# Patient Record
Sex: FEMALE | Race: WHITE | Employment: STUDENT | URBAN - METROPOLITAN AREA
[De-identification: names, ages, dates, MRNs, and addresses within clinical notes are randomized per-mention and may not be internally consistent; named-entity substitution may affect disease eponyms.]

---
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
## 2022-08-22 ENCOUNTER — OFFICE VISIT (OUTPATIENT)
Dept: FAMILY MEDICINE CLINIC | Facility: CLINIC | Age: 22
End: 2022-08-22

## 2022-08-22 VITALS
HEART RATE: 84 BPM | WEIGHT: 217 LBS | BODY MASS INDEX: 37.05 KG/M2 | OXYGEN SATURATION: 98 % | HEIGHT: 64 IN | DIASTOLIC BLOOD PRESSURE: 80 MMHG | SYSTOLIC BLOOD PRESSURE: 122 MMHG

## 2022-08-22 DIAGNOSIS — Z20.2 POSSIBLE EXPOSURE TO STD: ICD-10-CM

## 2022-08-22 DIAGNOSIS — R63.5 WEIGHT GAIN: ICD-10-CM

## 2022-08-22 DIAGNOSIS — K12.0 CANKER SORES ORAL: ICD-10-CM

## 2022-08-22 DIAGNOSIS — H65.193 ACUTE EFFUSION OF BOTH MIDDLE EARS: ICD-10-CM

## 2022-08-22 DIAGNOSIS — R53.82 CHRONIC FATIGUE: Primary | ICD-10-CM

## 2022-08-22 DIAGNOSIS — Z00.00 PREVENTATIVE HEALTH CARE: ICD-10-CM

## 2022-08-22 PROCEDURE — 84481 FREE ASSAY (FT-3): CPT | Performed by: NURSE PRACTITIONER

## 2022-08-22 PROCEDURE — 84403 ASSAY OF TOTAL TESTOSTERONE: CPT | Performed by: NURSE PRACTITIONER

## 2022-08-22 PROCEDURE — 99204 OFFICE O/P NEW MOD 45 MIN: CPT | Performed by: NURSE PRACTITIONER

## 2022-08-22 PROCEDURE — 80050 GENERAL HEALTH PANEL: CPT | Performed by: NURSE PRACTITIONER

## 2022-08-22 PROCEDURE — 84439 ASSAY OF FREE THYROXINE: CPT | Performed by: NURSE PRACTITIONER

## 2022-08-22 PROCEDURE — 82672 ASSAY OF ESTROGEN: CPT | Performed by: NURSE PRACTITIONER

## 2022-08-22 PROCEDURE — 86803 HEPATITIS C AB TEST: CPT | Performed by: NURSE PRACTITIONER

## 2022-08-22 RX ORDER — TRIAMCINOLONE ACETONIDE 55 UG/1
2 SPRAY, METERED NASAL DAILY
Qty: 16.5 G | Refills: 11 | Status: SHIPPED | OUTPATIENT
Start: 2022-08-22 | End: 2022-12-16 | Stop reason: SDUPTHER

## 2022-08-22 NOTE — PROGRESS NOTES
Chief Complaint  Establish Care (Just moved from [place] wanting to establish care. Wanting hormones and thyroid levels checked. Having increased weight gain. ), Mouth Lesions (Canker sores due to wisdom teeth wondering about a mouth wash to help. ), and Fatigue (Increased fatigue )    Subjective        Yuridia Martinez presents to [facility]  History of Present Illness    Patient presents to establish care.     Patient c/o difficulty with weight gain and fatigue. She had Nexplanon implanted in 2018, removed in February 2021 due to weight gain as much as 50lbs. Patient reports since removal, she has not been unable to lose any weight. She reports she is exercising regularly and reports healthier eating habits. Patient is concerned about hormone levels and thyroid. She does report irregular periods. Patient does report some body hair without any complaints of acne.     Patient also c/o sores to her mouth, reports she has always had frequent canker sores. She reports she needs wisdom teeth removed.  Patient requesting mouthwash to use as needed for pain.    Patient has some morning dizziness, resolves after being awake for a couple of hours. She denies headache chest pain, edema, cough or dyspnea.     Patient also requesting STD testing for preventative care.  She denies any exposures or symptoms of STDs.    PHQ-9 Depression Screening  Little interest or pleasure in doing things? 0-->not at all   Feeling down, depressed, or hopeless? 3-->nearly every day   Trouble falling or staying asleep, or sleeping too much? 3-->nearly every day   Feeling tired or having little energy? 3-->nearly every day   Poor appetite or overeating? 3-->nearly every day   Feeling bad about yourself - or that you are a failure or have let yourself or your family down? 1-->several days   Trouble concentrating on things, such as reading the newspaper or watching television? 3-->nearly every day   Moving or speaking so  "slowly that other people could have noticed? Or the opposite - being so fidgety or restless that you have been moving around a lot more than usual? 0-->not at all   Thoughts that you would be better off dead, or of hurting yourself in some way? 0-->not at all   PHQ-9 Total Score 16   If you checked off any problems, how difficult have these problems made it for you to do your work, take care of things at home, or get along with other people? somewhat difficult         Objective   Vital Signs:  /80 (BP Location: Left arm, Patient Position: Sitting, Cuff Size: Large Adult)   Pulse 84   Ht 162.6 cm (64\")   Wt 98.4 kg (217 lb)   SpO2 98%   BMI 37.25 kg/m²   Estimated body mass index is 37.25 kg/m² as calculated from the following:    Height as of this encounter: 162.6 cm (64\").    Weight as of this encounter: 98.4 kg (217 lb).          Physical Exam  Constitutional:       Appearance: Normal appearance.   HENT:      Head: Normocephalic.      Right Ear: A middle ear effusion is present.      Left Ear: A middle ear effusion is present.      Mouth/Throat:      Mouth: Oral lesions (x 1 ) present.   Cardiovascular:      Rate and Rhythm: Normal rate and regular rhythm.   Pulmonary:      Effort: Pulmonary effort is normal.      Breath sounds: Normal breath sounds.   Abdominal:      General: Abdomen is flat. Bowel sounds are normal.      Palpations: Abdomen is soft.   Musculoskeletal:         General: Normal range of motion.      Cervical back: Neck supple.      Right lower leg: No edema.      Left lower leg: No edema.   Skin:     General: Skin is warm and dry.   Neurological:      Mental Status: She is alert and oriented to person, place, and time.      Gait: Gait is intact.   Psychiatric:         Attention and Perception: Attention normal.         Mood and Affect: Mood normal.         Speech: Speech normal.        Result Review :                Assessment and Plan   Diagnoses and all orders for this visit:    1. " Chronic fatigue (Primary)  -     TSH  -     T4, Free  -     T3, Free  -     Testosterone  -     Estrogens, Total  -     Hemoglobin A1c  -     Cortisol    2. Canker sores oral  Assessment & Plan:  1.  Recommended patient establish care with a dentist  2.  Yasmin's Magic mouthwash as needed      3. Preventative health care  -     CBC & Differential  -     Comprehensive Metabolic Panel  -     Hepatitis C Antibody  -     Hemoglobin A1c    4. Weight gain  Assessment & Plan:  1.  Check labs    Orders:  -     Testosterone  -     Estrogens, Total  -     Hemoglobin A1c  -     Cortisol    5. Possible exposure to STD  -     HIV-1 / O / 2 Ag / Antibody 4th Generation  -     Cancel: Chlamydia trachomatis, Neisseria gonorrhoeae, PCR - Urine, Urine, Clean Catch  -     HSVIgM+HSV1/HSV2(IgG)    6. Acute effusion of both middle ears  Assessment & Plan:  1.  Start Nasacort 2 sprays each nostril daily      Other orders  -     Triamcinolone Acetonide (Nasacort Allergy 24HR) 55 MCG/ACT nasal inhaler; 2 sprays into the nostril(s) as directed by provider Daily.  Dispense: 16.5 g; Refill: 11  -     Nystatin (magic mouthwash); Swish and spit 5 mL Every 4 (Four) Hours As Needed (mouth pain) for up to 30 days.  Dispense: 500 mL; Refill: 0         I spent 35 minutes caring for Yuridia on this date of service. This time includes time spent by me in the following activities:preparing for the visit, reviewing tests, obtaining and/or reviewing a separately obtained history, performing a medically appropriate examination and/or evaluation , counseling and educating the patient/family/caregiver, ordering medications, tests, or procedures, documenting information in the medical record, independently interpreting results and communicating that information with the patient/family/caregiver and care coordination  Follow Up   Return in about 4 weeks (around 9/19/2022) for Fatigue.  Patient was given instructions and counseling regarding her condition or for  health maintenance advice. Please see specific information pulled into the AVS if appropriate.

## 2022-08-23 LAB
ALBUMIN SERPL-MCNC: 4.3 G/DL (ref 3.5–5.2)
ALBUMIN/GLOB SERPL: 1.4 G/DL
ALP SERPL-CCNC: 60 U/L (ref 39–117)
ALT SERPL W P-5'-P-CCNC: 15 U/L (ref 1–33)
ANION GAP SERPL CALCULATED.3IONS-SCNC: 10.9 MMOL/L (ref 5–15)
AST SERPL-CCNC: 15 U/L (ref 1–32)
BASOPHILS # BLD AUTO: 0.04 10*3/MM3 (ref 0–0.2)
BASOPHILS NFR BLD AUTO: 0.6 % (ref 0–1.5)
BILIRUB SERPL-MCNC: 0.3 MG/DL (ref 0–1.2)
BUN SERPL-MCNC: 10 MG/DL (ref 6–20)
BUN/CREAT SERPL: 16.7 (ref 7–25)
CALCIUM SPEC-SCNC: 9.2 MG/DL (ref 8.6–10.5)
CHLORIDE SERPL-SCNC: 106 MMOL/L (ref 98–107)
CO2 SERPL-SCNC: 23.1 MMOL/L (ref 22–29)
CREAT SERPL-MCNC: 0.6 MG/DL (ref 0.57–1)
DEPRECATED RDW RBC AUTO: 40.8 FL (ref 37–54)
EGFRCR SERPLBLD CKD-EPI 2021: 132 ML/MIN/1.73
EOSINOPHIL # BLD AUTO: 0.17 10*3/MM3 (ref 0–0.4)
EOSINOPHIL NFR BLD AUTO: 2.5 % (ref 0.3–6.2)
ERYTHROCYTE [DISTWIDTH] IN BLOOD BY AUTOMATED COUNT: 12.8 % (ref 12.3–15.4)
GLOBULIN UR ELPH-MCNC: 3 GM/DL
GLUCOSE SERPL-MCNC: 79 MG/DL (ref 65–99)
HCT VFR BLD AUTO: 40.6 % (ref 34–46.6)
HCV AB SER DONR QL: NORMAL
HGB BLD-MCNC: 13.4 G/DL (ref 12–15.9)
IMM GRANULOCYTES # BLD AUTO: 0.02 10*3/MM3 (ref 0–0.05)
IMM GRANULOCYTES NFR BLD AUTO: 0.3 % (ref 0–0.5)
LYMPHOCYTES # BLD AUTO: 2.13 10*3/MM3 (ref 0.7–3.1)
LYMPHOCYTES NFR BLD AUTO: 31.5 % (ref 19.6–45.3)
MCH RBC QN AUTO: 29 PG (ref 26.6–33)
MCHC RBC AUTO-ENTMCNC: 33 G/DL (ref 31.5–35.7)
MCV RBC AUTO: 87.9 FL (ref 79–97)
MONOCYTES # BLD AUTO: 0.55 10*3/MM3 (ref 0.1–0.9)
MONOCYTES NFR BLD AUTO: 8.1 % (ref 5–12)
NEUTROPHILS NFR BLD AUTO: 3.86 10*3/MM3 (ref 1.7–7)
NEUTROPHILS NFR BLD AUTO: 57 % (ref 42.7–76)
NRBC BLD AUTO-RTO: 0 /100 WBC (ref 0–0.2)
PLATELET # BLD AUTO: 388 10*3/MM3 (ref 140–450)
PMV BLD AUTO: 9.9 FL (ref 6–12)
POTASSIUM SERPL-SCNC: 4.1 MMOL/L (ref 3.5–5.2)
PROT SERPL-MCNC: 7.3 G/DL (ref 6–8.5)
RBC # BLD AUTO: 4.62 10*6/MM3 (ref 3.77–5.28)
SODIUM SERPL-SCNC: 140 MMOL/L (ref 136–145)
T3FREE SERPL-MCNC: 3.68 PG/ML (ref 2–4.4)
T4 FREE SERPL-MCNC: 1.17 NG/DL (ref 0.93–1.7)
TESTOST SERPL-MCNC: 18.1 NG/DL (ref 8.4–48.1)
TSH SERPL DL<=0.05 MIU/L-ACNC: 1.66 UIU/ML (ref 0.27–4.2)
WBC NRBC COR # BLD: 6.77 10*3/MM3 (ref 3.4–10.8)

## 2022-08-26 LAB — ESTROGEN SERPL-MCNC: 81 PG/ML

## 2022-08-29 ENCOUNTER — TELEPHONE (OUTPATIENT)
Dept: FAMILY MEDICINE CLINIC | Facility: CLINIC | Age: 22
End: 2022-08-29

## 2022-08-29 NOTE — TELEPHONE ENCOUNTER
Pharmacy Name: [facility]      Pharmacy representative name: SHAWN     Pharmacy representative phone number: [phone]    What medication are you calling in regards to: Nystatin (magic mouthwash)    What question does the pharmacy have:   PRESCRIPTION CALLS FOR HYDROCORTISONE LIQUID.   PHARMACY DOES NOT HAVE THIS MEDICATION    Who is the provider that prescribed the medication: CHELSEA FREEDMAN     Additional notes:

## 2022-09-29 ENCOUNTER — TELEPHONE (OUTPATIENT)
Dept: FAMILY MEDICINE CLINIC | Facility: CLINIC | Age: 22
End: 2022-09-29

## 2022-11-29 ENCOUNTER — HOSPITAL ENCOUNTER (EMERGENCY)
Facility: HOSPITAL | Age: 22
Discharge: HOME | End: 2022-11-29
Attending: EMERGENCY MEDICINE | Admitting: EMERGENCY MEDICINE

## 2022-11-29 VITALS
SYSTOLIC BLOOD PRESSURE: 138 MMHG | RESPIRATION RATE: 18 BRPM | DIASTOLIC BLOOD PRESSURE: 88 MMHG | HEART RATE: 88 BPM | WEIGHT: 214 LBS | BODY MASS INDEX: 36.54 KG/M2 | OXYGEN SATURATION: 100 % | HEIGHT: 64 IN | TEMPERATURE: 98.6 F

## 2022-11-29 DIAGNOSIS — J02.0 STREP THROAT: Primary | ICD-10-CM

## 2022-11-29 LAB — S PYO AG THROAT QL: POSITIVE

## 2022-11-29 PROCEDURE — 96372 THER/PROPH/DIAG INJ SC/IM: CPT

## 2022-11-29 PROCEDURE — 25010000002 PENICILLIN G BENZATHINE PER 1200000 UNITS

## 2022-11-29 PROCEDURE — 87651 STREP A DNA AMP PROBE: CPT | Performed by: EMERGENCY MEDICINE

## 2022-11-29 PROCEDURE — 99283 EMERGENCY DEPT VISIT LOW MDM: CPT

## 2022-11-29 RX ADMIN — PENICILLIN G BENZATHINE 1.2 MILLION UNITS: 1200000 INJECTION, SUSPENSION INTRAMUSCULAR at 03:06

## 2022-12-11 ENCOUNTER — HOSPITAL ENCOUNTER (EMERGENCY)
Facility: HOSPITAL | Age: 22
Discharge: HOME | End: 2022-12-11
Attending: EMERGENCY MEDICINE | Admitting: EMERGENCY MEDICINE

## 2022-12-11 ENCOUNTER — APPOINTMENT (OUTPATIENT)
Dept: GENERAL RADIOLOGY | Facility: HOSPITAL | Age: 22
End: 2022-12-11

## 2022-12-11 VITALS
HEART RATE: 94 BPM | WEIGHT: 212.52 LBS | SYSTOLIC BLOOD PRESSURE: 120 MMHG | BODY MASS INDEX: 36.28 KG/M2 | DIASTOLIC BLOOD PRESSURE: 84 MMHG | OXYGEN SATURATION: 100 % | HEIGHT: 64 IN | RESPIRATION RATE: 16 BRPM | TEMPERATURE: 98.4 F

## 2022-12-11 DIAGNOSIS — B34.9 VIRAL ILLNESS: ICD-10-CM

## 2022-12-11 DIAGNOSIS — J02.9 PHARYNGITIS, UNSPECIFIED ETIOLOGY: Primary | ICD-10-CM

## 2022-12-11 DIAGNOSIS — R19.7 DIARRHEA, UNSPECIFIED TYPE: ICD-10-CM

## 2022-12-11 LAB
ALBUMIN SERPL-MCNC: 4.5 G/DL (ref 3.5–5.2)
ALBUMIN/GLOB SERPL: 1.4 G/DL
ALP SERPL-CCNC: 69 U/L (ref 39–117)
ALT SERPL W P-5'-P-CCNC: 14 U/L (ref 1–33)
ANION GAP SERPL CALCULATED.3IONS-SCNC: 11 MMOL/L (ref 5–15)
AST SERPL-CCNC: 14 U/L (ref 1–32)
B-HCG UR QL: NEGATIVE
BACTERIA UR QL AUTO: ABNORMAL /HPF
BASOPHILS # BLD AUTO: 0.1 10*3/MM3 (ref 0–0.2)
BASOPHILS NFR BLD AUTO: 0.6 % (ref 0–1.5)
BILIRUB SERPL-MCNC: 0.4 MG/DL (ref 0–1.2)
BILIRUB UR QL STRIP: NEGATIVE
BUN SERPL-MCNC: 10 MG/DL (ref 6–20)
BUN/CREAT SERPL: 14.1 (ref 7–25)
CALCIUM SPEC-SCNC: 9.5 MG/DL (ref 8.6–10.5)
CHLORIDE SERPL-SCNC: 103 MMOL/L (ref 98–107)
CLARITY UR: CLEAR
CO2 SERPL-SCNC: 25 MMOL/L (ref 22–29)
COLOR UR: YELLOW
CREAT SERPL-MCNC: 0.71 MG/DL (ref 0.57–1)
DEPRECATED RDW RBC AUTO: 42.4 FL (ref 37–54)
EGFRCR SERPLBLD CKD-EPI 2021: 125 ML/MIN/1.73
EOSINOPHIL # BLD AUTO: 0.1 10*3/MM3 (ref 0–0.4)
EOSINOPHIL NFR BLD AUTO: 0.7 % (ref 0.3–6.2)
ERYTHROCYTE [DISTWIDTH] IN BLOOD BY AUTOMATED COUNT: 13.2 % (ref 12.3–15.4)
FLUAV SUBTYP SPEC NAA+PROBE: NOT DETECTED
FLUBV RNA ISLT QL NAA+PROBE: NOT DETECTED
GLOBULIN UR ELPH-MCNC: 3.2 GM/DL
GLUCOSE SERPL-MCNC: 86 MG/DL (ref 65–99)
GLUCOSE UR STRIP-MCNC: NEGATIVE MG/DL
HCT VFR BLD AUTO: 40.7 % (ref 34–46.6)
HGB BLD-MCNC: 13.7 G/DL (ref 12–15.9)
HGB UR QL STRIP.AUTO: ABNORMAL
HYALINE CASTS UR QL AUTO: ABNORMAL /LPF
KETONES UR QL STRIP: NEGATIVE
LEUKOCYTE ESTERASE UR QL STRIP.AUTO: ABNORMAL
LIPASE SERPL-CCNC: 18 U/L (ref 13–60)
LYMPHOCYTES # BLD AUTO: 2.6 10*3/MM3 (ref 0.7–3.1)
LYMPHOCYTES NFR BLD AUTO: 23.2 % (ref 19.6–45.3)
MCH RBC QN AUTO: 29.6 PG (ref 26.6–33)
MCHC RBC AUTO-ENTMCNC: 33.7 G/DL (ref 31.5–35.7)
MCV RBC AUTO: 87.7 FL (ref 79–97)
MONOCYTES # BLD AUTO: 0.6 10*3/MM3 (ref 0.1–0.9)
MONOCYTES NFR BLD AUTO: 5.4 % (ref 5–12)
NEUTROPHILS NFR BLD AUTO: 7.8 10*3/MM3 (ref 1.7–7)
NEUTROPHILS NFR BLD AUTO: 70.1 % (ref 42.7–76)
NITRITE UR QL STRIP: NEGATIVE
NRBC BLD AUTO-RTO: 0 /100 WBC (ref 0–0.2)
PH UR STRIP.AUTO: <=5 [PH] (ref 5–8)
PLATELET # BLD AUTO: 396 10*3/MM3 (ref 140–450)
PMV BLD AUTO: 7.4 FL (ref 6–12)
POTASSIUM SERPL-SCNC: 4.2 MMOL/L (ref 3.5–5.2)
PROT SERPL-MCNC: 7.7 G/DL (ref 6–8.5)
PROT UR QL STRIP: ABNORMAL
RBC # BLD AUTO: 4.64 10*6/MM3 (ref 3.77–5.28)
RBC # UR STRIP: ABNORMAL /HPF
REF LAB TEST METHOD: ABNORMAL
SARS-COV-2 RNA PNL SPEC NAA+PROBE: NOT DETECTED
SODIUM SERPL-SCNC: 139 MMOL/L (ref 136–145)
SP GR UR STRIP: 1.02 (ref 1–1.03)
SQUAMOUS #/AREA URNS HPF: ABNORMAL /HPF
UROBILINOGEN UR QL STRIP: ABNORMAL
WBC # UR STRIP: ABNORMAL /HPF
WBC NRBC COR # BLD: 11.2 10*3/MM3 (ref 3.4–10.8)

## 2022-12-11 PROCEDURE — 81001 URINALYSIS AUTO W/SCOPE: CPT | Performed by: NURSE PRACTITIONER

## 2022-12-11 PROCEDURE — 87635 SARS-COV-2 COVID-19 AMP PRB: CPT | Performed by: EMERGENCY MEDICINE

## 2022-12-11 PROCEDURE — 85025 COMPLETE CBC W/AUTO DIFF WBC: CPT | Performed by: NURSE PRACTITIONER

## 2022-12-11 PROCEDURE — 83690 ASSAY OF LIPASE: CPT | Performed by: NURSE PRACTITIONER

## 2022-12-11 PROCEDURE — 71045 X-RAY EXAM CHEST 1 VIEW: CPT

## 2022-12-11 PROCEDURE — 80053 COMPREHEN METABOLIC PANEL: CPT | Performed by: NURSE PRACTITIONER

## 2022-12-11 PROCEDURE — 81025 URINE PREGNANCY TEST: CPT | Performed by: NURSE PRACTITIONER

## 2022-12-11 PROCEDURE — 99283 EMERGENCY DEPT VISIT LOW MDM: CPT

## 2022-12-11 PROCEDURE — 87502 INFLUENZA DNA AMP PROBE: CPT | Performed by: NURSE PRACTITIONER

## 2022-12-11 RX ORDER — SODIUM CHLORIDE 0.9 % (FLUSH) 0.9 %
10 SYRINGE (ML) INJECTION AS NEEDED
Status: DISCONTINUED | OUTPATIENT
Start: 2022-12-11 | End: 2022-12-11 | Stop reason: HOSPADM

## 2022-12-12 NOTE — DISCHARGE INSTRUCTIONS
Warm salt water gargles, Chloraseptic for throat, may use Tylenol, ibuprofen for pain or fever, plenty of fluids, may use Mucinex DM for cough congestion.  Return new or worsening symptoms

## 2022-12-12 NOTE — ED PROVIDER NOTES
Subjective   History of Present Illness     Provider in Triage Note  Patient is a 22-year-old female who complains of right ear pain and nausea and vomiting.  She states that she also has sore throat she states that she was treated with a penicillin shot last week but is still not feeling any better.  She rates her pain an 8/10-    22-year-old female presents with complaints of headache sore throat nausea.  She reports no vomiting to me.  She states she has had diarrhea 4 times a day.  She complained of some abdominal pain describes it as mild just like a bellyache.  She reports she had a temperature 102 earlier today.  She states she has had a cough for about 3 weeks.  She reports headache but no body aches.  She states no difficulty urinating other than she may have been going less since she has not been drinking well.  She reports no vaginal discharge or bleeding.    Review of Systems   Constitutional: Positive for fever. Negative for unexpected weight change.   HENT: Positive for sore throat. Negative for congestion.    Eyes: Negative for pain.   Respiratory: Positive for cough. Negative for shortness of breath.    Cardiovascular: Negative for chest pain and leg swelling.   Gastrointestinal: Positive for abdominal pain, diarrhea and nausea. Negative for vomiting.   Genitourinary: Positive for decreased urine volume. Negative for dysuria, urgency, vaginal bleeding and vaginal discharge.   Musculoskeletal: Negative for back pain and myalgias.   Skin: Negative for rash.   Neurological: Positive for headaches. Negative for dizziness and weakness.   Psychiatric/Behavioral: Negative for confusion.       Past Medical History:   Diagnosis Date   • ADHD (attention deficit hyperactivity disorder)    • Anxiety    • Depression    • PTSD (post-traumatic stress disorder)    • Scoliosis    • Visual impairment        No Known Allergies    Past Surgical History:   Procedure Laterality Date   • ADENOIDECTOMY     • FRACTURE  SURGERY     • SPLENECTOMY, PARTIAL     • TONSILLECTOMY         Family History   Problem Relation Age of Onset   • Drug abuse Mother    • Heart disease Father    • Thyroid disease Father    • Diabetes Maternal Grandfather        Social History     Socioeconomic History   • Marital status: Single   Tobacco Use   • Smoking status: Never   • Smokeless tobacco: Never   Vaping Use   • Vaping Use: Never used   Substance and Sexual Activity   • Alcohol use: Yes     Comment: occ   • Drug use: Never   • Sexual activity: Yes     Partners: Male     Birth control/protection: None     She reports no regular medication she did take some cough and cold medication earlier.      Objective   Physical Exam  22-year-old female awake alert.  Generally overweight.  Pupils equal round react light.  TMs clear bilaterally.  Oropharynx reveals some pharyngeal erythema without exudate.  Neck supple chest clear equal breath sounds.  Cardiovascular regular rate and rhythm abdomen is soft without localizing tenderness mass rebound or guarding extremities without tenderness or edema.  Neurologic exam without focal findings noted.  Skin without rash  Procedures           ED Course      Results for orders placed or performed during the hospital encounter of 12/11/22   Influenza Antigen, Rapid - Swab, Nasopharynx    Specimen: Nasopharynx; Swab   Result Value Ref Range    Influenza A PCR Not Detected Not Detected    Influenza B PCR Not Detected Not Detected   COVID-19,CEPHEID/CURTIS,COR/SAEED/PAD/LORENZO/MAD IN-HOUSE(OR EMERGENT/ADD-ON),NP SWAB IN TRANSPORT MEDIA 3-4 HR TAT, RT-PCR - Swab, Nasopharynx    Specimen: Nasopharynx; Swab   Result Value Ref Range    COVID19 Not Detected Not Detected - Ref. Range   Comprehensive Metabolic Panel    Specimen: Arm, Left; Blood   Result Value Ref Range    Glucose 86 65 - 99 mg/dL    BUN 10 6 - 20 mg/dL    Creatinine 0.71 0.57 - 1.00 mg/dL    Sodium 139 136 - 145 mmol/L    Potassium 4.2 3.5 - 5.2 mmol/L    Chloride 103 98  - 107 mmol/L    CO2 25.0 22.0 - 29.0 mmol/L    Calcium 9.5 8.6 - 10.5 mg/dL    Total Protein 7.7 6.0 - 8.5 g/dL    Albumin 4.50 3.50 - 5.20 g/dL    ALT (SGPT) 14 1 - 33 U/L    AST (SGOT) 14 1 - 32 U/L    Alkaline Phosphatase 69 39 - 117 U/L    Total Bilirubin 0.4 0.0 - 1.2 mg/dL    Globulin 3.2 gm/dL    A/G Ratio 1.4 g/dL    BUN/Creatinine Ratio 14.1 7.0 - 25.0    Anion Gap 11.0 5.0 - 15.0 mmol/L    eGFR 125.0 >60.0 mL/min/1.73   Lipase    Specimen: Arm, Left; Blood   Result Value Ref Range    Lipase 18 13 - 60 U/L   Pregnancy, Urine - Urine, Clean Catch    Specimen: Urine, Clean Catch   Result Value Ref Range    HCG, Urine QL Negative Negative   Urinalysis With Culture If Indicated - Urine, Clean Catch    Specimen: Urine, Clean Catch   Result Value Ref Range    Color, UA Yellow Yellow, Straw    Appearance, UA Clear Clear    pH, UA <=5.0 5.0 - 8.0    Specific Gravity, UA 1.022 1.005 - 1.030    Glucose, UA Negative Negative    Ketones, UA Negative Negative    Bilirubin, UA Negative Negative    Blood, UA Large (3+) (A) Negative    Protein, UA Trace (A) Negative    Leuk Esterase, UA Trace (A) Negative    Nitrite, UA Negative Negative    Urobilinogen, UA 0.2 E.U./dL 0.2 - 1.0 E.U./dL   CBC Auto Differential    Specimen: Arm, Left; Blood   Result Value Ref Range    WBC 11.20 (H) 3.40 - 10.80 10*3/mm3    RBC 4.64 3.77 - 5.28 10*6/mm3    Hemoglobin 13.7 12.0 - 15.9 g/dL    Hematocrit 40.7 34.0 - 46.6 %    MCV 87.7 79.0 - 97.0 fL    MCH 29.6 26.6 - 33.0 pg    MCHC 33.7 31.5 - 35.7 g/dL    RDW 13.2 12.3 - 15.4 %    RDW-SD 42.4 37.0 - 54.0 fl    MPV 7.4 6.0 - 12.0 fL    Platelets 396 140 - 450 10*3/mm3    Neutrophil % 70.1 42.7 - 76.0 %    Lymphocyte % 23.2 19.6 - 45.3 %    Monocyte % 5.4 5.0 - 12.0 %    Eosinophil % 0.7 0.3 - 6.2 %    Basophil % 0.6 0.0 - 1.5 %    Neutrophils, Absolute 7.80 (H) 1.70 - 7.00 10*3/mm3    Lymphocytes, Absolute 2.60 0.70 - 3.10 10*3/mm3    Monocytes, Absolute 0.60 0.10 - 0.90 10*3/mm3     "Eosinophils, Absolute 0.10 0.00 - 0.40 10*3/mm3    Basophils, Absolute 0.10 0.00 - 0.20 10*3/mm3    nRBC 0.0 0.0 - 0.2 /100 WBC   Urinalysis, Microscopic Only - Urine, Clean Catch    Specimen: Urine, Clean Catch   Result Value Ref Range    RBC, UA 6-12 (A) None Seen /HPF    WBC, UA 3-5 (A) None Seen /HPF    Bacteria, UA Trace (A) None Seen /HPF    Squamous Epithelial Cells, UA 3-6 (A) None Seen, 0-2 /HPF    Hyaline Casts, UA 0-2 None Seen /LPF    Methodology Automated Microscopy      No radiology results for the last day  Medications   sodium chloride 0.9 % flush 10 mL (has no administration in time range)     /82 (BP Location: Left arm, Patient Position: Sitting)   Pulse 83   Temp 98.4 °F (36.9 °C) (Temporal)   Resp 16   Ht 162.6 cm (64\")   Wt 96.4 kg (212 lb 8.4 oz)   SpO2 100%   BMI 36.48 kg/m²                                        MDM  Chart review: Patient was treated for strep throat on the 29th with LA Bicillin she had encounter for dental examination without abnormal finding on the seventh of this month.  Comorbidity: As per past history  Differential: Viral illness influenza, COVID, gastroenteritis  My EKG interpretation:   Lab: Urinalysis 6-12 red cells 3-5 white cells on voided specimen urine hCG negative influenza not detected lipase normal comprehensive metabolic panel normal white count 11.2 with normal differential.,  COVID not detected  Radiology: I reviewed chest x-ray no acute cardiopulmonary abnormality noted.  Discussion/treatment: Patient's findings were discussed with her.  Her symptoms are most consistent with viral illness requiring symptomatic treatment.  Advised Tylenol, ibuprofen, warm salt water gargles Chloraseptic for throat.  To follow-up with primary provider, return new or worsening symptoms.  She can use Mucinex DM for cough.  She has no findings that would suggest surgical pathology in her abdomen  Patient was evaluated using appropriate PPE      Final diagnoses: "   Pharyngitis, unspecified etiology   Diarrhea, unspecified type   Viral illness       ED Disposition  ED Disposition     ED Disposition   Discharge    Condition   Stable    Comment   --             BhagatMercedes, APRN  [address] [address]  [address]  [place] [address]  [phone]    Schedule an appointment as soon as possible for a visit            Medication List      No changes were made to your prescriptions during this visit.          Oli Wood MD  12/11/22 2126

## 2022-12-16 ENCOUNTER — OFFICE VISIT (OUTPATIENT)
Dept: FAMILY MEDICINE CLINIC | Facility: CLINIC | Age: 22
End: 2022-12-16

## 2022-12-16 VITALS
OXYGEN SATURATION: 99 % | DIASTOLIC BLOOD PRESSURE: 80 MMHG | WEIGHT: 214 LBS | BODY MASS INDEX: 36.54 KG/M2 | HEART RATE: 84 BPM | HEIGHT: 64 IN | SYSTOLIC BLOOD PRESSURE: 128 MMHG

## 2022-12-16 DIAGNOSIS — R53.82 CHRONIC FATIGUE: ICD-10-CM

## 2022-12-16 DIAGNOSIS — Z20.2 POSSIBLE EXPOSURE TO STD: ICD-10-CM

## 2022-12-16 DIAGNOSIS — N89.8 VAGINAL ITCHING: ICD-10-CM

## 2022-12-16 DIAGNOSIS — K12.0 CANKER SORES ORAL: Primary | ICD-10-CM

## 2022-12-16 DIAGNOSIS — G47.00 INSOMNIA, UNSPECIFIED TYPE: ICD-10-CM

## 2022-12-16 LAB
C TRACH RRNA CVX QL NAA+PROBE: NOT DETECTED
HBA1C MFR BLD: 5 % (ref 3.5–5.6)
N GONORRHOEA RRNA SPEC QL NAA+PROBE: NOT DETECTED

## 2022-12-16 PROCEDURE — 86696 HERPES SIMPLEX TYPE 2 TEST: CPT | Performed by: NURSE PRACTITIONER

## 2022-12-16 PROCEDURE — G0432 EIA HIV-1/HIV-2 SCREEN: HCPCS | Performed by: NURSE PRACTITIONER

## 2022-12-16 PROCEDURE — 87070 CULTURE OTHR SPECIMN AEROBIC: CPT | Performed by: NURSE PRACTITIONER

## 2022-12-16 PROCEDURE — 86695 HERPES SIMPLEX TYPE 1 TEST: CPT | Performed by: NURSE PRACTITIONER

## 2022-12-16 PROCEDURE — 87205 SMEAR GRAM STAIN: CPT | Performed by: NURSE PRACTITIONER

## 2022-12-16 PROCEDURE — 87591 N.GONORRHOEAE DNA AMP PROB: CPT | Performed by: NURSE PRACTITIONER

## 2022-12-16 PROCEDURE — 82533 TOTAL CORTISOL: CPT | Performed by: NURSE PRACTITIONER

## 2022-12-16 PROCEDURE — 99214 OFFICE O/P EST MOD 30 MIN: CPT | Performed by: NURSE PRACTITIONER

## 2022-12-16 PROCEDURE — 83036 HEMOGLOBIN GLYCOSYLATED A1C: CPT | Performed by: NURSE PRACTITIONER

## 2022-12-16 PROCEDURE — 87491 CHLMYD TRACH DNA AMP PROBE: CPT | Performed by: NURSE PRACTITIONER

## 2022-12-16 RX ORDER — TRAZODONE HYDROCHLORIDE 50 MG/1
50 TABLET ORAL NIGHTLY
Qty: 30 TABLET | Refills: 1 | Status: SHIPPED | OUTPATIENT
Start: 2022-12-16

## 2022-12-16 RX ORDER — METRONIDAZOLE 500 MG/1
500 TABLET ORAL 2 TIMES DAILY
Qty: 14 TABLET | Refills: 0 | Status: SHIPPED | OUTPATIENT
Start: 2022-12-16 | End: 2022-12-23

## 2022-12-16 RX ORDER — TRIAMCINOLONE ACETONIDE 55 UG/1
2 SPRAY, METERED NASAL DAILY
Qty: 16.5 G | Refills: 11 | Status: SHIPPED | OUTPATIENT
Start: 2022-12-16 | End: 2023-12-16

## 2022-12-16 RX ORDER — DIPHENHYDRAMINE, LIDOCAINE, NYSTATIN
5 KIT ORAL 4 TIMES DAILY
Qty: 150 ML | Refills: 0 | Status: SHIPPED | OUTPATIENT
Start: 2022-12-16

## 2022-12-16 NOTE — PROGRESS NOTES
Venipuncture Blood Specimen Collection  Venipuncture performed in the right arm by Rosalina Higginbotham MA with good hemostasis. Patient tolerated the procedure well without complications.   12/16/22   Rosalina Higginbotham MA

## 2022-12-16 NOTE — PROGRESS NOTES
"Chief Complaint  Vaginitis (Yeast infection/having vaginal dryness/itching), Insomnia, and Follow-up (Pt feels like she is sick all of the time. She feels like this happens often and is concerned about her immune system. )    Rajendra Martinez presents to [facility]  History of Present Illness    Patient presents for f/u visit.     Patient seen previously with c/o weight gain and fatigue. Labs have been remarkable thus far. Patient has difficulty falling and staying asleep. She has tried Melatonin, tea, essential oils. Patient reports getting 6 hours of sleep every 3 days.     Patient is c/o vaginal itching and white discharge. No known aggravating factors. Patient reports itching comes every two weeks, lasts for one week at a time. Symptoms resolve temporarily without intervention.     Patient previously prescribed Yasmin's Magic mouthwash for canker sores, pharmacy did not fill. Patient has consultation with oral surgery to discuss pulling wisdom teeth.       Objective   Vital Signs:  /80 (BP Location: Left arm, Patient Position: Sitting, Cuff Size: Large Adult)   Pulse 84   Ht 162.6 cm (64\")   Wt 97.1 kg (214 lb)   SpO2 99%   BMI 36.73 kg/m²   Estimated body mass index is 36.73 kg/m² as calculated from the following:    Height as of this encounter: 162.6 cm (64\").    Weight as of this encounter: 97.1 kg (214 lb).          Physical Exam  Constitutional:       Appearance: Normal appearance.   HENT:      Head: Normocephalic.   Cardiovascular:      Rate and Rhythm: Normal rate and regular rhythm.   Pulmonary:      Effort: Pulmonary effort is normal.      Breath sounds: Normal breath sounds.   Abdominal:      General: Abdomen is flat. Bowel sounds are normal.      Palpations: Abdomen is soft.   Genitourinary:     Labia:         Right: No rash.         Left: No rash.       Vagina: Vaginal discharge and erythema present.      Cervix: Normal.      Rectum: Normal. "   Musculoskeletal:         General: Normal range of motion.      Cervical back: Neck supple.      Right lower leg: No edema.      Left lower leg: No edema.   Skin:     General: Skin is warm and dry.   Neurological:      Mental Status: She is alert and oriented to person, place, and time.      Gait: Gait is intact.   Psychiatric:         Attention and Perception: Attention normal.         Mood and Affect: Mood normal.         Speech: Speech normal.        Result Review :    CMP    CMP 8/22/22 12/11/22   Glucose 79 86   BUN 10 10   Creatinine 0.60 0.71   Sodium 140 139   Potassium 4.1 4.2   Chloride 106 103   Calcium 9.2 9.5   Albumin 4.30 4.50   Total Bilirubin 0.3 0.4   Alkaline Phosphatase 60 69   AST (SGOT) 15 14   ALT (SGPT) 15 14           CBC    CBC 8/22/22 12/11/22   WBC 6.77 11.20 (A)   RBC 4.62 4.64   Hemoglobin 13.4 13.7   Hematocrit 40.6 40.7   MCV 87.9 87.7   MCH 29.0 29.6   MCHC 33.0 33.7   RDW 12.8 13.2   Platelets 388 396   (A) Abnormal value                TSH    TSH 8/22/22   TSH 1.660                             Assessment and Plan   Diagnoses and all orders for this visit:    1. Canker sores oral (Primary)  Assessment & Plan:  1.  Resend nystatin solution to pharmacy  2.  Keep follow-up with oral surgery      2. Possible exposure to STD  -     Chlamydia trachomatis, Neisseria gonorrhoeae, PCR - Urine, Urine, Random Void  -     Genital Culture - Swab, Cervix    3. Chronic fatigue  Assessment & Plan:  1.  Labs reviewed with patient  2.  Check A1c level      4. Vaginal itching  Assessment & Plan:  1.  Erythema and discharge noted on pelvic exam  2.  Recommended daily feminine probiotic  3.  Start Flagyl 500 mg twice daily x7-day  4.  Genital culture collected  5.  Proceed with STD testing as previously ordered      5. Insomnia, unspecified type  Assessment & Plan:  1.  Start trazodone 50 mg nightly as needed      Other orders  -     nystatin susp + lidocaine viscous (MAGIC MOUTHWASH) oral suspension;  Swish and swallow 5 mL 4 (Four) Times a Day.  Dispense: 150 mL; Refill: 0  -     Triamcinolone Acetonide (Nasacort Allergy 24HR) 55 MCG/ACT nasal inhaler; 2 sprays into the nostril(s) as directed by provider Daily.  Dispense: 16.5 g; Refill: 11  -     traZODone (DESYREL) 50 MG tablet; Take 1 tablet by mouth Every Night.  Dispense: 30 tablet; Refill: 1  -     metroNIDAZOLE (Flagyl) 500 MG tablet; Take 1 tablet by mouth 2 (Two) Times a Day for 7 days.  Dispense: 14 tablet; Refill: 0         I spent 30 minutes caring for [place] on this date of service. This time includes time spent by me in the following activities:preparing for the visit, reviewing tests, obtaining and/or reviewing a separately obtained history, performing a medically appropriate examination and/or evaluation , counseling and educating the patient/family/caregiver, ordering medications, tests, or procedures, documenting information in the medical record, independently interpreting results and communicating that information with the patient/family/caregiver and care coordination  Follow Up   Return in about 8 weeks (around 2/10/2023) for Insomnia.  Patient was given instructions and counseling regarding her condition or for health maintenance advice. Please see specific information pulled into the AVS if appropriate.

## 2022-12-16 NOTE — ASSESSMENT & PLAN NOTE
1.  Erythema and discharge noted on pelvic exam  2.  Recommended daily feminine probiotic  3.  Start Flagyl 500 mg twice daily x7-day  4.  Genital culture collected  5.  Proceed with STD testing as previously ordered

## 2022-12-17 LAB
CORTIS SERPL-MCNC: 10.3 MCG/DL
HIV1+2 AB SER QL: NORMAL

## 2022-12-18 LAB
HSV1 IGG SER IA-ACNC: 56.4 INDEX (ref 0–0.9)
HSV2 IGG SER IA-ACNC: <0.91 INDEX (ref 0–0.9)

## 2022-12-18 RX ORDER — FLUCONAZOLE 150 MG/1
150 TABLET ORAL ONCE
Qty: 1 TABLET | Refills: 0 | Status: SHIPPED | OUTPATIENT
Start: 2022-12-18 | End: 2022-12-28 | Stop reason: SDUPTHER

## 2022-12-19 LAB
BACTERIA SPEC AEROBE CULT: ABNORMAL
BACTERIA SPEC AEROBE CULT: ABNORMAL
GRAM STN SPEC: ABNORMAL
GRAM STN SPEC: ABNORMAL

## 2022-12-28 RX ORDER — FLUCONAZOLE 150 MG/1
150 TABLET ORAL ONCE
Qty: 2 TABLET | Refills: 0 | Status: SHIPPED | OUTPATIENT
Start: 2022-12-28 | End: 2022-12-28

## 2023-08-07 ENCOUNTER — OFFICE VISIT (OUTPATIENT)
Dept: FAMILY MEDICINE CLINIC | Facility: CLINIC | Age: 23
End: 2023-08-07
Payer: MEDICAID

## 2023-08-07 VITALS
SYSTOLIC BLOOD PRESSURE: 122 MMHG | WEIGHT: 235 LBS | BODY MASS INDEX: 40.12 KG/M2 | HEART RATE: 92 BPM | DIASTOLIC BLOOD PRESSURE: 68 MMHG | OXYGEN SATURATION: 99 % | HEIGHT: 64 IN

## 2023-08-07 DIAGNOSIS — Z20.2 POSSIBLE EXPOSURE TO STD: ICD-10-CM

## 2023-08-07 DIAGNOSIS — N91.2 AMENORRHEA: ICD-10-CM

## 2023-08-07 DIAGNOSIS — Z00.00 PREVENTATIVE HEALTH CARE: ICD-10-CM

## 2023-08-07 DIAGNOSIS — N89.8 VAGINAL ITCHING: ICD-10-CM

## 2023-08-07 DIAGNOSIS — E66.01 CLASS 3 SEVERE OBESITY WITHOUT SERIOUS COMORBIDITY WITH BODY MASS INDEX (BMI) OF 40.0 TO 44.9 IN ADULT, UNSPECIFIED OBESITY TYPE: Primary | ICD-10-CM

## 2023-08-07 DIAGNOSIS — R82.90 CLOUDY URINE: ICD-10-CM

## 2023-08-07 PROBLEM — E66.813 CLASS 3 SEVERE OBESITY WITHOUT SERIOUS COMORBIDITY WITH BODY MASS INDEX (BMI) OF 40.0 TO 44.9 IN ADULT: Status: ACTIVE | Noted: 2023-08-07

## 2023-08-07 PROCEDURE — 1159F MED LIST DOCD IN RCRD: CPT | Performed by: NURSE PRACTITIONER

## 2023-08-07 PROCEDURE — 99214 OFFICE O/P EST MOD 30 MIN: CPT | Performed by: NURSE PRACTITIONER

## 2023-08-07 PROCEDURE — 81001 URINALYSIS AUTO W/SCOPE: CPT | Performed by: NURSE PRACTITIONER

## 2023-08-07 PROCEDURE — 83036 HEMOGLOBIN GLYCOSYLATED A1C: CPT | Performed by: NURSE PRACTITIONER

## 2023-08-07 PROCEDURE — 84402 ASSAY OF FREE TESTOSTERONE: CPT | Performed by: NURSE PRACTITIONER

## 2023-08-07 PROCEDURE — 1160F RVW MEDS BY RX/DR IN RCRD: CPT | Performed by: NURSE PRACTITIONER

## 2023-08-07 PROCEDURE — 84403 ASSAY OF TOTAL TESTOSTERONE: CPT | Performed by: NURSE PRACTITIONER

## 2023-08-07 PROCEDURE — G0432 EIA HIV-1/HIV-2 SCREEN: HCPCS | Performed by: NURSE PRACTITIONER

## 2023-08-07 PROCEDURE — 87086 URINE CULTURE/COLONY COUNT: CPT | Performed by: NURSE PRACTITIONER

## 2023-08-07 PROCEDURE — 86803 HEPATITIS C AB TEST: CPT | Performed by: NURSE PRACTITIONER

## 2023-08-07 PROCEDURE — 80050 GENERAL HEALTH PANEL: CPT | Performed by: NURSE PRACTITIONER

## 2023-08-07 RX ORDER — TRIAMCINOLONE ACETONIDE 55 UG/1
2 SPRAY, METERED NASAL DAILY
Qty: 16.5 G | Refills: 11 | Status: SHIPPED | OUTPATIENT
Start: 2023-08-07 | End: 2024-08-06

## 2023-08-07 RX ORDER — VALACYCLOVIR HYDROCHLORIDE 1 G/1
1000 TABLET, FILM COATED ORAL 2 TIMES DAILY PRN
Qty: 30 TABLET | Refills: 0 | Status: SHIPPED | OUTPATIENT
Start: 2023-08-07

## 2023-08-07 RX ORDER — METRONIDAZOLE 500 MG/1
500 TABLET ORAL 3 TIMES DAILY
Qty: 21 TABLET | Refills: 0 | Status: SHIPPED | OUTPATIENT
Start: 2023-08-07 | End: 2023-08-14

## 2023-08-07 RX ORDER — FLUCONAZOLE 150 MG/1
150 TABLET ORAL DAILY
Qty: 3 TABLET | Refills: 0 | Status: SHIPPED | OUTPATIENT
Start: 2023-08-07

## 2023-08-07 NOTE — ASSESSMENT & PLAN NOTE
Patient's (Body mass index is 40.34 kg/mý.) indicates that they are morbidly/severely obese (BMI > 40 or > 35 with obesity - related health condition) with health conditions that include none . Weight is worsening. BMI  is above average; BMI management plan is completed. We discussed portion control, increasing exercise, joining a fitness center or start home based exercise program, management of depression/anxiety/stress to control compensatory eating, decreasing alcohol consumption, and pharmacologic options including advised patient call insurance regarding coverage . Labs today

## 2023-08-07 NOTE — PROGRESS NOTES
Venipuncture Blood Specimen Collection  Venipuncture performed in the right arm by Rosalina Higginbotham MA with good hemostasis. Patient tolerated the procedure well without complications.   08/07/23   Rosalina Higginbotham MA

## 2023-08-07 NOTE — PROGRESS NOTES
"Chief Complaint  Follow-up (Follow up would like to discuss weight loss medication) and Exposure to STD (Would like STD testing because significant other cheated recently )    Rajendra Martinez presents to [facility]  History of Present Illness    Patient presents with request for STI screening. She reports SO was unfaithful, wants testing. Patient has intermittent white vaginal discharge with irritation for the last few months.  She was seen in December, genital culture positive for Candida and Gardnerella vaginalis.  Patient treated with Diflucan and Flagyl at that time.  Symptoms improved but have since returned.  Patient has not yet had a Pap smear.    Patient also presents with concerns of obesity. She is eating consistently, reports diet is well balanced. Patient has eliminated fatty foods, soft drinks. She is exercising 5-6 times weekly.  Patient reports even with lifestyle changes, she has still gained 20 pounds in the last 6 months.  Patient also reports that she has not had a period in months, an ongoing problem for a few years.  Patient reports initial gain was after the insertion of Nexplanon.    Objective   Vital Signs:  /68 (BP Location: Left arm, Patient Position: Sitting, Cuff Size: Large Adult)   Pulse 92   Ht 162.6 cm (64\")   Wt 107 kg (235 lb)   SpO2 99%   BMI 40.34 kg/mý   Estimated body mass index is 40.34 kg/mý as calculated from the following:    Height as of this encounter: 162.6 cm (64\").    Weight as of this encounter: 107 kg (235 lb).       Class 3 Severe Obesity (BMI >=40). Obesity-related health conditions include the following: none. Obesity is worsening. BMI is is above average; BMI management plan is completed. We discussed portion control, increasing exercise, management of depression/anxiety/stress to control compensatory eating, decreasing alcohol consumption, and pharmacologic options including phentermine/Wegovy " .      Physical Exam  Constitutional:       Appearance: Normal appearance.   HENT:      Head: Normocephalic.   Cardiovascular:      Rate and Rhythm: Normal rate and regular rhythm.   Pulmonary:      Effort: Pulmonary effort is normal.      Breath sounds: Normal breath sounds.   Abdominal:      General: Abdomen is flat. Bowel sounds are normal.      Palpations: Abdomen is soft.   Musculoskeletal:         General: Normal range of motion.      Cervical back: Neck supple.      Right lower leg: No edema.      Left lower leg: No edema.   Skin:     General: Skin is warm and dry.   Neurological:      Mental Status: She is alert and oriented to person, place, and time.      Gait: Gait is intact.   Psychiatric:         Attention and Perception: Attention normal.         Mood and Affect: Mood normal.         Speech: Speech normal.      Result Review :                   Assessment and Plan   Diagnoses and all orders for this visit:    1. Class 3 severe obesity without serious comorbidity with body mass index (BMI) of 40.0 to 44.9 in adult, unspecified obesity type (Primary)  Assessment & Plan:  Patient's (Body mass index is 40.34 kg/mý.) indicates that they are morbidly/severely obese (BMI > 40 or > 35 with obesity - related health condition) with health conditions that include none . Weight is worsening. BMI  is above average; BMI management plan is completed. We discussed portion control, increasing exercise, joining a fitness center or start home based exercise program, management of depression/anxiety/stress to control compensatory eating, decreasing alcohol consumption, and pharmacologic options including advised patient call insurance regarding coverage . Labs today     Orders:  -     CBC (No Diff)  -     Comprehensive Metabolic Panel  -     Hemoglobin A1c  -     TSH  -     Testosterone (Free & Total), LC / MS    2. Possible exposure to STD  -     Chlamydia trachomatis, Neisseria gonorrhoeae, PCR - Urine, Urine, Random  Void  -     Hepatitis C Antibody  -     HIV-1 / O / 2 Ag / Antibody 4th Generation    3. Preventative health care  -     CBC (No Diff)  -     Comprehensive Metabolic Panel  -     Hemoglobin A1c  -     TSH    4. Cloudy urine  -     Urinalysis With Culture If Indicated - Urine, Clean Catch    5. Amenorrhea  -     Ambulatory Referral to Gynecology    6. Vaginal itching  Assessment & Plan:  Diflucan 150 mg daily x 3 days  Flagyl regimen x 7 days  STI screening  Refer to GYN       Other orders  -     valACYclovir (Valtrex) 1000 MG tablet; Take 1 tablet by mouth 2 (Two) Times a Day As Needed (cold sore).  Dispense: 30 tablet; Refill: 0  -     fluconazole (Diflucan) 150 MG tablet; Take 1 tablet by mouth Daily.  Dispense: 3 tablet; Refill: 0  -     metroNIDAZOLE (Flagyl) 500 MG tablet; Take 1 tablet by mouth 3 (Three) Times a Day for 7 days.  Dispense: 21 tablet; Refill: 0  -     Triamcinolone Acetonide (Nasacort Allergy 24HR) 55 MCG/ACT nasal inhaler; 2 sprays into the nostril(s) as directed by provider Daily.  Dispense: 16.5 g; Refill: 11           I spent 30 minutes caring for Yuridia on this date of service. This time includes time spent by me in the following activities:preparing for the visit, reviewing tests, obtaining and/or reviewing a separately obtained history, performing a medically appropriate examination and/or evaluation , counseling and educating the patient/family/caregiver, ordering medications, tests, or procedures, referring and communicating with other health care professionals , documenting information in the medical record, and care coordination  Follow Up   Return if symptoms worsen or fail to improve.  Patient was given instructions and counseling regarding her condition or for health maintenance advice. Please see specific information pulled into the AVS if appropriate.

## 2023-08-08 DIAGNOSIS — Z20.2 POSSIBLE EXPOSURE TO STD: Primary | ICD-10-CM

## 2023-08-08 LAB
ALBUMIN SERPL-MCNC: 5 G/DL (ref 3.5–5.2)
ALBUMIN/GLOB SERPL: 1.7 G/DL
ALP SERPL-CCNC: 67 U/L (ref 39–117)
ALT SERPL W P-5'-P-CCNC: 30 U/L (ref 1–33)
ANION GAP SERPL CALCULATED.3IONS-SCNC: 12 MMOL/L (ref 5–15)
AST SERPL-CCNC: 22 U/L (ref 1–32)
BACTERIA UR QL AUTO: ABNORMAL /HPF
BILIRUB SERPL-MCNC: 0.4 MG/DL (ref 0–1.2)
BILIRUB UR QL STRIP: NEGATIVE
BUN SERPL-MCNC: 12 MG/DL (ref 6–20)
BUN/CREAT SERPL: 17.6 (ref 7–25)
CALCIUM SPEC-SCNC: 10.1 MG/DL (ref 8.6–10.5)
CHLORIDE SERPL-SCNC: 103 MMOL/L (ref 98–107)
CLARITY UR: ABNORMAL
CO2 SERPL-SCNC: 25 MMOL/L (ref 22–29)
COLOR UR: YELLOW
CREAT SERPL-MCNC: 0.68 MG/DL (ref 0.57–1)
DEPRECATED RDW RBC AUTO: 39.5 FL (ref 37–54)
EGFRCR SERPLBLD CKD-EPI 2021: 127.3 ML/MIN/1.73
ERYTHROCYTE [DISTWIDTH] IN BLOOD BY AUTOMATED COUNT: 12.2 % (ref 12.3–15.4)
GLOBULIN UR ELPH-MCNC: 2.9 GM/DL
GLUCOSE SERPL-MCNC: 70 MG/DL (ref 65–99)
GLUCOSE UR STRIP-MCNC: NEGATIVE MG/DL
HBA1C MFR BLD: 5.3 % (ref 4.8–5.6)
HCT VFR BLD AUTO: 42.3 % (ref 34–46.6)
HCV AB SER DONR QL: NORMAL
HGB BLD-MCNC: 14.2 G/DL (ref 12–15.9)
HGB UR QL STRIP.AUTO: ABNORMAL
HIV 1+2 AB+HIV1 P24 AG SERPL QL IA: NORMAL
HYALINE CASTS UR QL AUTO: ABNORMAL /LPF
KETONES UR QL STRIP: NEGATIVE
LEUKOCYTE ESTERASE UR QL STRIP.AUTO: ABNORMAL
MCH RBC QN AUTO: 29.9 PG (ref 26.6–33)
MCHC RBC AUTO-ENTMCNC: 33.6 G/DL (ref 31.5–35.7)
MCV RBC AUTO: 89.1 FL (ref 79–97)
NITRITE UR QL STRIP: NEGATIVE
PH UR STRIP.AUTO: 6 [PH] (ref 5–8)
PLATELET # BLD AUTO: 438 10*3/MM3 (ref 140–450)
PMV BLD AUTO: 9.5 FL (ref 6–12)
POTASSIUM SERPL-SCNC: 3.8 MMOL/L (ref 3.5–5.2)
PROT SERPL-MCNC: 7.9 G/DL (ref 6–8.5)
PROT UR QL STRIP: ABNORMAL
RBC # BLD AUTO: 4.75 10*6/MM3 (ref 3.77–5.28)
RBC # UR STRIP: ABNORMAL /HPF
REF LAB TEST METHOD: ABNORMAL
SODIUM SERPL-SCNC: 140 MMOL/L (ref 136–145)
SP GR UR STRIP: 1.03 (ref 1–1.03)
SQUAMOUS #/AREA URNS HPF: ABNORMAL /HPF
TSH SERPL DL<=0.05 MIU/L-ACNC: 2.41 UIU/ML (ref 0.27–4.2)
UROBILINOGEN UR QL STRIP: ABNORMAL
WBC # UR STRIP: ABNORMAL /HPF
WBC NRBC COR # BLD: 7.52 10*3/MM3 (ref 3.4–10.8)

## 2023-08-08 NOTE — PROGRESS NOTES
Notified via [employer].  There was not enough urine for chlamydia and gonorrhea so I have asked patient to come in for repeat urine sample

## 2023-08-09 LAB — BACTERIA SPEC AEROBE CULT: NORMAL

## 2023-08-15 LAB
TESTOST FREE SERPL-MCNC: 2.9 PG/ML (ref 0–4.2)
TESTOST SERPL-MCNC: 56.6 NG/DL (ref 10–55)

## 2023-09-15 ENCOUNTER — TELEPHONE (OUTPATIENT)
Dept: FAMILY MEDICINE CLINIC | Facility: CLINIC | Age: 23
End: 2023-09-15
Payer: MEDICAID

## 2023-09-15 NOTE — TELEPHONE ENCOUNTER
Caller: Yuridia Martinez    Relationship: Self    Best call back number: [phone]     PATIENT STATES THAT A PRIOR AUTHORIZATION IS NEEDED FOR valACYclovir (Valtrex) 1000 MG tablet. PLEASE CALL AND ADVISE.

## 2023-09-18 RX ORDER — ACYCLOVIR 400 MG/1
400 TABLET ORAL
Qty: 60 TABLET | Refills: 0 | Status: SHIPPED | OUTPATIENT
Start: 2023-09-18

## 2023-10-17 DIAGNOSIS — E66.01 CLASS 3 SEVERE OBESITY WITHOUT SERIOUS COMORBIDITY WITH BODY MASS INDEX (BMI) OF 40.0 TO 44.9 IN ADULT, UNSPECIFIED OBESITY TYPE: Primary | ICD-10-CM

## 2023-11-28 RX ORDER — VALACYCLOVIR HYDROCHLORIDE 1 G/1
TABLET, FILM COATED ORAL
Qty: 30 TABLET | Refills: 0 | Status: SHIPPED | OUTPATIENT
Start: 2023-11-28

## 2024-01-24 ENCOUNTER — OFFICE VISIT (OUTPATIENT)
Dept: FAMILY MEDICINE CLINIC | Facility: CLINIC | Age: 24
End: 2024-01-24
Payer: MEDICAID

## 2024-01-24 VITALS
BODY MASS INDEX: 40.8 KG/M2 | TEMPERATURE: 98 F | WEIGHT: 239 LBS | HEIGHT: 64 IN | DIASTOLIC BLOOD PRESSURE: 84 MMHG | HEART RATE: 94 BPM | SYSTOLIC BLOOD PRESSURE: 132 MMHG | OXYGEN SATURATION: 97 % | RESPIRATION RATE: 18 BRPM

## 2024-01-24 DIAGNOSIS — B34.9 VIRAL INFECTION: Primary | ICD-10-CM

## 2024-01-24 DIAGNOSIS — R05.1 ACUTE COUGH: ICD-10-CM

## 2024-01-24 DIAGNOSIS — J02.9 SORE THROAT: ICD-10-CM

## 2024-01-24 LAB
B PARAPERT DNA SPEC QL NAA+PROBE: NOT DETECTED
B PERT DNA SPEC QL NAA+PROBE: NOT DETECTED
C PNEUM DNA NPH QL NAA+NON-PROBE: NOT DETECTED
EXPIRATION DATE: NORMAL
FLUAV AG UPPER RESP QL IA.RAPID: NOT DETECTED
FLUAV SUBTYP SPEC NAA+PROBE: NOT DETECTED
FLUBV AG UPPER RESP QL IA.RAPID: NOT DETECTED
FLUBV RNA ISLT QL NAA+PROBE: NOT DETECTED
HADV DNA SPEC NAA+PROBE: NOT DETECTED
HCOV 229E RNA SPEC QL NAA+PROBE: NOT DETECTED
HCOV HKU1 RNA SPEC QL NAA+PROBE: NOT DETECTED
HCOV NL63 RNA SPEC QL NAA+PROBE: NOT DETECTED
HCOV OC43 RNA SPEC QL NAA+PROBE: NOT DETECTED
HMPV RNA NPH QL NAA+NON-PROBE: NOT DETECTED
HPIV1 RNA ISLT QL NAA+PROBE: NOT DETECTED
HPIV2 RNA SPEC QL NAA+PROBE: NOT DETECTED
HPIV3 RNA NPH QL NAA+PROBE: NOT DETECTED
HPIV4 P GENE NPH QL NAA+PROBE: NOT DETECTED
INTERNAL CONTROL: NORMAL
Lab: 5710
Lab: NORMAL
Lab: NORMAL
M PNEUMO IGG SER IA-ACNC: NOT DETECTED
RHINOVIRUS RNA SPEC NAA+PROBE: DETECTED
RSV AG SPEC QL: NEGATIVE
RSV RNA NPH QL NAA+NON-PROBE: NOT DETECTED
S PYO AG THROAT QL: NEGATIVE
SARS-COV-2 AG UPPER RESP QL IA.RAPID: NOT DETECTED
SARS-COV-2 RNA NPH QL NAA+NON-PROBE: NOT DETECTED

## 2024-01-24 PROCEDURE — 87880 STREP A ASSAY W/OPTIC: CPT | Performed by: NURSE PRACTITIONER

## 2024-01-24 PROCEDURE — 0202U NFCT DS 22 TRGT SARS-COV-2: CPT | Performed by: NURSE PRACTITIONER

## 2024-01-24 PROCEDURE — 87428 SARSCOV & INF VIR A&B AG IA: CPT | Performed by: NURSE PRACTITIONER

## 2024-01-24 PROCEDURE — 99213 OFFICE O/P EST LOW 20 MIN: CPT | Performed by: NURSE PRACTITIONER

## 2024-01-24 PROCEDURE — 87807 RSV ASSAY W/OPTIC: CPT | Performed by: NURSE PRACTITIONER

## 2024-01-24 RX ORDER — BROMPHENIRAMINE MALEATE, PSEUDOEPHEDRINE HYDROCHLORIDE, AND DEXTROMETHORPHAN HYDROBROMIDE 2; 30; 10 MG/5ML; MG/5ML; MG/5ML
5 SYRUP ORAL 4 TIMES DAILY PRN
Qty: 120 ML | Refills: 0 | Status: SHIPPED | OUTPATIENT
Start: 2024-01-24 | End: 2024-01-31

## 2024-01-24 NOTE — PROGRESS NOTES
Chief Complaint  Chief Complaint   Patient presents with    Cough    Shortness of Breath    Sore Throat    Fatigue    Nasal Congestion    Headache     All Symptoms began yesterday 1/23/24           Rajendra Martinez presents to [facility] for   History of Present Illness    Patient presents for acute same-day visit for symptoms as mentioned in chief complaint, denies fever.  She is taking Tylenol and ibuprofen for body aches.  She does report an ill contact at work + COVID/influenza last week        The following portions of the patient's history were reviewed and updated as appropriate: allergies, current medications, past family history, past medical history, past social history, past surgical history and problem list.    Past Medical History:   Diagnosis Date    ADHD (attention deficit hyperactivity disorder)     Anxiety     Depression     PTSD (post-traumatic stress disorder)     Scoliosis     Visual impairment      Past Surgical History:   Procedure Laterality Date    ADENOIDECTOMY      FRACTURE SURGERY      SPLENECTOMY, PARTIAL      TONSILLECTOMY       Family History   Problem Relation Age of Onset    Drug abuse Mother     Heart disease Father     Thyroid disease Father     Diabetes Maternal Grandfather      Social History     Tobacco Use    Smoking status: Never    Smokeless tobacco: Never   Substance Use Topics    Alcohol use: Yes     Comment: occ       Current Outpatient Medications:     traZODone (DESYREL) 50 MG tablet, Take 1 tablet by mouth Every Night., Disp: 30 tablet, Rfl: 1    azithromycin (Zithromax) 250 MG tablet, Take 2 tablets the first day, then 1 tablet daily for 4 days., Disp: 6 tablet, Rfl: 0    benzonatate (Tessalon Perles) 100 MG capsule, Take 1 capsule by mouth 3 (Three) Times a Day As Needed for Cough., Disp: 30 capsule, Rfl: 0    metFORMIN (GLUCOPHAGE) 500 MG tablet, Take 1 tablet by mouth 2 (Two) Times a Day With Meals., Disp: 60 tablet,  "Rfl: 2    methylPREDNISolone (MEDROL) 4 MG dose pack, Take as directed on package instructions., Disp: 21 tablet, Rfl: 0    Triamcinolone Acetonide (Nasacort Allergy 24HR) 55 MCG/ACT nasal inhaler, 2 sprays into the nostril(s) as directed by provider Daily., Disp: 16.5 g, Rfl: 11    valACYclovir (VALTREX) 1000 MG tablet, Take 1 tablet by mouth 2 (Two) Times a Day As Needed (cold sore)., Disp: 60 tablet, Rfl: 0    Objective   Vital Signs:   /84 (BP Location: Right arm, Patient Position: Sitting, Cuff Size: Adult)   Pulse 94   Temp 98 °F (36.7 °C) (Temporal)   Resp 18   Ht 162.6 cm (64\")   Wt 108 kg (239 lb)   SpO2 97%   BMI 41.02 kg/m²           Physical Exam  Constitutional:       General: She is not in acute distress.     Appearance: She is normal weight. She is ill-appearing.   HENT:      Head: Normocephalic.      Right Ear: Tympanic membrane and ear canal normal.      Left Ear: Tympanic membrane and ear canal normal.      Nose: Rhinorrhea present.      Mouth/Throat:      Pharynx: Posterior oropharyngeal erythema present.   Cardiovascular:      Rate and Rhythm: Normal rate.      Heart sounds: Normal heart sounds. No murmur heard.  Pulmonary:      Effort: Pulmonary effort is normal.      Breath sounds: No wheezing or rhonchi.      Comments: Harsh, tight nonproductive cough  Musculoskeletal:         General: Normal range of motion.      Cervical back: Normal range of motion.   Skin:     General: Skin is warm.   Neurological:      General: No focal deficit present.      Mental Status: She is alert and oriented to person, place, and time. Mental status is at baseline.   Psychiatric:         Mood and Affect: Mood normal.         Behavior: Behavior normal.         Thought Content: Thought content normal.         Judgment: Judgment normal.          Result Review :     Office Visit on 01/24/2024   Component Date Value Ref Range Status    SARS Antigen 01/24/2024 Not Detected  Not Detected, Presumptive Negative " Final    Influenza A Antigen JOSEPH 01/24/2024 Not Detected  Not Detected Final    Influenza B Antigen JOSEPH 01/24/2024 Not Detected  Not Detected Final    Internal Control 01/24/2024 Passed  Passed Final    Lot Number 01/24/2024 [id number]   Final    Expiration Date 01/24/2024 11/10/2024   Final    Rapid Strep A Screen 01/24/2024 Negative  Negative, VALID, INVALID, Not Performed Final    Internal Control 01/24/2024 Passed  Passed Final    Lot Number 01/24/2024 [id number]   Final    Expiration Date 01/24/2024 1,012,025   Final    Respiratory Syncytial Virus 01/24/2024 negative   Final    Internal Control 01/24/2024 Passed  Passed Final    Lot Number 01/24/2024 [id number]   Final    Expiration Date 01/24/2024 10/27/2024   Final    ADENOVIRUS, PCR 01/24/2024 Not Detected  Not Detected Final    Coronavirus 229E 01/24/2024 Not Detected  Not Detected Final    Coronavirus HKU1 01/24/2024 Not Detected  Not Detected Final    Coronavirus NL63 01/24/2024 Not Detected  Not Detected Final    Coronavirus OC43 01/24/2024 Not Detected  Not Detected Final    COVID19 01/24/2024 Not Detected  Not Detected - Ref. Range Final    Human Metapneumovirus 01/24/2024 Not Detected  Not Detected Final    Human Rhinovirus/Enterovirus 01/24/2024 Detected (A)  Not Detected Final    Influenza A PCR 01/24/2024 Not Detected  Not Detected Final    Influenza B PCR 01/24/2024 Not Detected  Not Detected Final    Parainfluenza Virus 1 01/24/2024 Not Detected  Not Detected Final    Parainfluenza Virus 2 01/24/2024 Not Detected  Not Detected Final    Parainfluenza Virus 3 01/24/2024 Not Detected  Not Detected Final    Parainfluenza Virus 4 01/24/2024 Not Detected  Not Detected Final    RSV, PCR 01/24/2024 Not Detected  Not Detected Final    Bordetella pertussis pcr 01/24/2024 Not Detected  Not Detected Final    Bordetella parapertussis PCR 01/24/2024 Not Detected  Not Detected Final    Chlamydophila pneumoniae PCR 01/24/2024 Not Detected  Not Detected Final     Mycoplasma pneumo by PCR 01/24/2024 Not Detected  Not Detected Final                              Assessment and Plan    Diagnoses and all orders for this visit:    1. Viral infection (Primary)  -     Respiratory Panel PCR w/COVID-19(SARS-CoV-2) RAY/HUI/SAEED/PAD/COR/JENNIFER In-House, NP Swab in UTM/VTM, 2 HR TAT - Swab, Nasopharynx    2. Sore throat  -     POCT rapid strep A    3. Acute cough  -     POCT SARS-CoV-2 Antigen JOSEPH + Flu  -     POCT RSV    Other orders  -     Discontinue: brompheniramine-pseudoephedrine-DM 30-2-10 MG/5ML syrup; Take 5 mL by mouth 4 (Four) Times a Day As Needed for Congestion or Cough. (Patient not taking: Reported on 1/31/2024)  Dispense: 120 mL; Refill: 0    Rapid COVID, influenza a/b, RSV and strep negative in office today  We will send viral panel to lab for confirmation  Treat the symptoms with ibuprofen, Tylenol, Bromfed as needed or Mucinex fastmax  Work note provided for today and tomorrow    I spent 20 minutes caring for Yuridia Martinez on this date of service. This time includes time spent by me in the following activities: preparing for the visit, reviewing tests, performing a medically appropriate examination and/or evaluation , counseling and educating the patient/family/caregiver, ordering medications, tests, or procedures and documenting information in the medical record        Follow Up     Return if symptoms worsen or fail to improve in 3-4 days.  Patient was given instructions and counseling regarding her condition or for health maintenance advice. Please see specific information pulled into the AVS if appropriate.        Part of this note may be an electronic transcription/translation of spoken language to printed text using the Dragon Dictation System

## 2024-01-24 NOTE — LETTER
January 24, 2024     Patient: Yuridia Martinez   YOB: 2000   Date of Visit: 1/24/2024       To Whom It May Concern:    Yuridia Martinez was seen in office today. Please excuse her absence for today 01/24/2024 and possibly 01/25/2024.    If you have any questions, please contact the office at [phone].       Sincerely,        TYRA Martinez    CC:   No Recipients

## 2024-01-30 ENCOUNTER — TELEPHONE (OUTPATIENT)
Dept: FAMILY MEDICINE CLINIC | Facility: CLINIC | Age: 24
End: 2024-01-30
Payer: MEDICAID

## 2024-01-30 NOTE — TELEPHONE ENCOUNTER
See if patient can be here tomorrow at 9:30am. We will need September to open that time slot to add to schedule.

## 2024-01-30 NOTE — TELEPHONE ENCOUNTER
Pt mom with pt in room with her called in with concern of pt having fever chills, dry cough, shortness of breath, headache. Pt states that cough has gotten worse in the past couple of days. Pt states she has come in contact with 3 people who have tested positive for flu. Pt was wanting same day appt, informed schedule is full rest of week. Please advise

## 2024-01-30 NOTE — TELEPHONE ENCOUNTER
Called pt with providers response, pt can be here at 9:30. I will add her to schedule when September gets here

## 2024-01-31 ENCOUNTER — OFFICE VISIT (OUTPATIENT)
Dept: FAMILY MEDICINE CLINIC | Facility: CLINIC | Age: 24
End: 2024-01-31
Payer: MEDICAID

## 2024-01-31 VITALS
TEMPERATURE: 100.8 F | BODY MASS INDEX: 41.02 KG/M2 | SYSTOLIC BLOOD PRESSURE: 128 MMHG | OXYGEN SATURATION: 96 % | HEIGHT: 64 IN | DIASTOLIC BLOOD PRESSURE: 80 MMHG | HEART RATE: 123 BPM

## 2024-01-31 DIAGNOSIS — J02.9 SORE THROAT: ICD-10-CM

## 2024-01-31 DIAGNOSIS — E66.01 CLASS 3 SEVERE OBESITY WITHOUT SERIOUS COMORBIDITY WITH BODY MASS INDEX (BMI) OF 40.0 TO 44.9 IN ADULT, UNSPECIFIED OBESITY TYPE: ICD-10-CM

## 2024-01-31 DIAGNOSIS — J22 ACUTE LOWER RESPIRATORY INFECTION: Primary | ICD-10-CM

## 2024-01-31 DIAGNOSIS — R05.9 COUGH, UNSPECIFIED TYPE: ICD-10-CM

## 2024-01-31 LAB
B PARAPERT DNA SPEC QL NAA+PROBE: NOT DETECTED
B PERT DNA SPEC QL NAA+PROBE: NOT DETECTED
C PNEUM DNA NPH QL NAA+NON-PROBE: NOT DETECTED
EXPIRATION DATE: NORMAL
EXPIRATION DATE: NORMAL
FLUAV AG UPPER RESP QL IA.RAPID: NOT DETECTED
FLUAV SUBTYP SPEC NAA+PROBE: DETECTED
FLUBV AG UPPER RESP QL IA.RAPID: NOT DETECTED
FLUBV RNA ISLT QL NAA+PROBE: NOT DETECTED
HADV DNA SPEC NAA+PROBE: NOT DETECTED
HCOV 229E RNA SPEC QL NAA+PROBE: NOT DETECTED
HCOV HKU1 RNA SPEC QL NAA+PROBE: NOT DETECTED
HCOV NL63 RNA SPEC QL NAA+PROBE: NOT DETECTED
HCOV OC43 RNA SPEC QL NAA+PROBE: NOT DETECTED
HMPV RNA NPH QL NAA+NON-PROBE: NOT DETECTED
HPIV1 RNA ISLT QL NAA+PROBE: NOT DETECTED
HPIV2 RNA SPEC QL NAA+PROBE: NOT DETECTED
HPIV3 RNA NPH QL NAA+PROBE: NOT DETECTED
HPIV4 P GENE NPH QL NAA+PROBE: NOT DETECTED
INTERNAL CONTROL: NORMAL
INTERNAL CONTROL: NORMAL
Lab: NORMAL
Lab: NORMAL
M PNEUMO IGG SER IA-ACNC: NOT DETECTED
RHINOVIRUS RNA SPEC NAA+PROBE: DETECTED
RSV RNA NPH QL NAA+NON-PROBE: NOT DETECTED
S PYO AG THROAT QL: NEGATIVE
SARS-COV-2 AG UPPER RESP QL IA.RAPID: NOT DETECTED
SARS-COV-2 RNA NPH QL NAA+NON-PROBE: NOT DETECTED

## 2024-01-31 PROCEDURE — 0202U NFCT DS 22 TRGT SARS-COV-2: CPT | Performed by: NURSE PRACTITIONER

## 2024-01-31 PROCEDURE — 1159F MED LIST DOCD IN RCRD: CPT | Performed by: NURSE PRACTITIONER

## 2024-01-31 PROCEDURE — 1160F RVW MEDS BY RX/DR IN RCRD: CPT | Performed by: NURSE PRACTITIONER

## 2024-01-31 RX ORDER — VALACYCLOVIR HYDROCHLORIDE 1 G/1
1000 TABLET, FILM COATED ORAL 2 TIMES DAILY PRN
Qty: 60 TABLET | Refills: 0 | Status: SHIPPED | OUTPATIENT
Start: 2024-01-31

## 2024-01-31 RX ORDER — TRIAMCINOLONE ACETONIDE 55 UG/1
2 SPRAY, METERED NASAL DAILY
Qty: 16.5 G | Refills: 11 | Status: SHIPPED | OUTPATIENT
Start: 2024-01-31 | End: 2025-01-30

## 2024-01-31 RX ORDER — BENZONATATE 100 MG/1
100 CAPSULE ORAL 3 TIMES DAILY PRN
Qty: 30 CAPSULE | Refills: 0 | Status: SHIPPED | OUTPATIENT
Start: 2024-01-31

## 2024-01-31 RX ORDER — AZITHROMYCIN 250 MG/1
TABLET, FILM COATED ORAL
Qty: 6 TABLET | Refills: 0 | Status: SHIPPED | OUTPATIENT
Start: 2024-01-31

## 2024-01-31 RX ORDER — METHYLPREDNISOLONE 4 MG/1
TABLET ORAL
Qty: 21 TABLET | Refills: 0 | Status: SHIPPED | OUTPATIENT
Start: 2024-01-31

## 2024-01-31 NOTE — PROGRESS NOTES
"Chief Complaint  Cough, Nasal Congestion, Generalized Body Aches, Fever, and Headache    Rajendra Martinez presents to [facility]  History of Present Illness    Patient presents with complaints of productive cough, nasal and chest congestion, body aches, fever and headache.  She had an exposure to influenza A on 1/27.  Symptoms present since Sunday.    Objective   Vital Signs:  /80 (BP Location: Left arm, Patient Position: Sitting, Cuff Size: Large Adult)   Pulse (!) 123   Temp (!) 100.8 °F (38.2 °C) (Oral)   Ht 162.6 cm (64\")   SpO2 96%   BMI 41.02 kg/m²   Estimated body mass index is 41.02 kg/m² as calculated from the following:    Height as of this encounter: 162.6 cm (64\").    Weight as of 1/24/24: 108 kg (239 lb).               Physical Exam  Constitutional:       Appearance: Normal appearance.   HENT:      Head: Normocephalic.      Right Ear: Tympanic membrane normal.      Left Ear: Tympanic membrane normal.      Mouth/Throat:      Pharynx: Posterior oropharyngeal erythema present.   Cardiovascular:      Rate and Rhythm: Normal rate and regular rhythm.   Pulmonary:      Effort: Pulmonary effort is normal.      Breath sounds: Normal breath sounds. Examination of the right-lower field reveals wheezing. Examination of the left-lower field reveals wheezing.   Abdominal:      General: Abdomen is flat. Bowel sounds are normal.      Palpations: Abdomen is soft.   Musculoskeletal:         General: Normal range of motion.      Cervical back: Neck supple.      Right lower leg: No edema.      Left lower leg: No edema.   Skin:     General: Skin is warm and dry.   Neurological:      Mental Status: She is alert and oriented to person, place, and time.      Gait: Gait is intact.   Psychiatric:         Attention and Perception: Attention normal.         Mood and Affect: Mood normal.         Speech: Speech normal.        Result Review :                     Assessment and " Plan     Diagnoses and all orders for this visit:    1. Acute lower respiratory infection (Primary)    2. Class 3 severe obesity without serious comorbidity with body mass index (BMI) of 40.0 to 44.9 in adult, unspecified obesity type  -     metFORMIN (GLUCOPHAGE) 500 MG tablet; Take 1 tablet by mouth 2 (Two) Times a Day With Meals.  Dispense: 60 tablet; Refill: 2    Other orders  -     azithromycin (Zithromax) 250 MG tablet; Take 2 tablets the first day, then 1 tablet daily for 4 days.  Dispense: 6 tablet; Refill: 0  -     methylPREDNISolone (MEDROL) 4 MG dose pack; Take as directed on package instructions.  Dispense: 21 tablet; Refill: 0  -     benzonatate (Tessalon Perles) 100 MG capsule; Take 1 capsule by mouth 3 (Three) Times a Day As Needed for Cough.  Dispense: 30 capsule; Refill: 0  -     valACYclovir (VALTREX) 1000 MG tablet; Take 1 tablet by mouth 2 (Two) Times a Day As Needed (cold sore).  Dispense: 60 tablet; Refill: 0  -     Triamcinolone Acetonide (Nasacort Allergy 24HR) 55 MCG/ACT nasal inhaler; 2 sprays into the nostril(s) as directed by provider Daily.  Dispense: 16.5 g; Refill: 11      - rapid tests are - for Flu A/B, COVID and RSV  - respiratory send out panel ordered  - start Azithromycin, Medrol, finish all of medication  -Alternate Tylenol and Motrin for fever and bodyaches  -Tessalon Perles as needed for cough     I spent 22 minutes caring for Yuridia on this date of service. This time includes time spent by me in the following activities:preparing for the visit, reviewing tests, obtaining and/or reviewing a separately obtained history, performing a medically appropriate examination and/or evaluation , counseling and educating the patient/family/caregiver, ordering medications, tests, or procedures, documenting information in the medical record, and care coordination  Follow Up     Return if symptoms worsen or fail to improve.  Patient was given instructions and counseling regarding her  condition or for health maintenance advice. Please see specific information pulled into the AVS if appropriate.

## 2024-02-08 ENCOUNTER — OFFICE VISIT (OUTPATIENT)
Dept: FAMILY MEDICINE CLINIC | Facility: CLINIC | Age: 24
End: 2024-02-08
Payer: MEDICAID

## 2024-02-08 VITALS
HEART RATE: 93 BPM | WEIGHT: 235 LBS | SYSTOLIC BLOOD PRESSURE: 122 MMHG | OXYGEN SATURATION: 97 % | BODY MASS INDEX: 40.12 KG/M2 | DIASTOLIC BLOOD PRESSURE: 80 MMHG | HEIGHT: 64 IN

## 2024-02-08 DIAGNOSIS — F90.2 ATTENTION DEFICIT HYPERACTIVITY DISORDER (ADHD), COMBINED TYPE: ICD-10-CM

## 2024-02-08 DIAGNOSIS — Z86.19 HX OF CHLAMYDIA INFECTION: ICD-10-CM

## 2024-02-08 DIAGNOSIS — E66.01 CLASS 3 SEVERE OBESITY WITHOUT SERIOUS COMORBIDITY WITH BODY MASS INDEX (BMI) OF 40.0 TO 44.9 IN ADULT, UNSPECIFIED OBESITY TYPE: ICD-10-CM

## 2024-02-08 DIAGNOSIS — Z23 NEED FOR INFLUENZA VACCINATION: ICD-10-CM

## 2024-02-08 LAB
C TRACH RRNA CVX QL NAA+PROBE: NOT DETECTED
N GONORRHOEA RRNA SPEC QL NAA+PROBE: NOT DETECTED

## 2024-02-08 PROCEDURE — 80307 DRUG TEST PRSMV CHEM ANLYZR: CPT | Performed by: NURSE PRACTITIONER

## 2024-02-08 PROCEDURE — 87491 CHLMYD TRACH DNA AMP PROBE: CPT | Performed by: NURSE PRACTITIONER

## 2024-02-08 PROCEDURE — 87591 N.GONORRHOEAE DNA AMP PROB: CPT | Performed by: NURSE PRACTITIONER

## 2024-02-08 NOTE — ASSESSMENT & PLAN NOTE
Urine drug screen  ADHD screening questionnaire reviewed  If appropriate, start Adderall 15 mg daily  Follow-up in 4 to 6 weeks

## 2024-02-08 NOTE — ASSESSMENT & PLAN NOTE
Patient's (Body mass index is 40.34 kg/m².) indicates that they are morbidly/severely obese (BMI > 40 or > 35 with obesity - related health condition) with health conditions that include none . Weight is unchanged. BMI  is above average; BMI management plan is completed. We discussed portion control, increasing exercise, joining a fitness center or start home based exercise program, consulting a Bariatric surgeon, management of depression/anxiety/stress to control compensatory eating, and decreasing alcohol consumption.

## 2024-02-08 NOTE — PROGRESS NOTES
"Chief Complaint  Follow-up (Follow up on metformin ) and ADHD (Has issues with focus and wants to talk about treatment )    Subjective        Yuridia Martinez presents to [facility]  History of Present Illness    Patient presents for follow-up visit.    Patient previously presented with concerns of obesity.  She reported well-balanced diet and regular exercise.  Patient's testosterone level was elevated, consistent with PCOS.  Patient was started on metformin 500 mg daily and patient was advised to follow a PCOS diet.  Weight is unchanged. Patient is having stomach upset with medication. Pap smear completed in December per GYN. She had US today, reports advised consistent with PCOS but results are not final. Patient reports + for chlamydia in December, missed f/u to ensure infection resolved.     Patient is also having concerns about ADHD. She reports diagnosed with ADHD in childhood, previously on medication. Patient describes difficulty in college classes, procrastinates, cannot keep a constant conversation (always changing subjects), cannot only sit for 20 minutes, fidgets, is easily distracted.     Objective   Vital Signs:  /80 (BP Location: Left arm, Patient Position: Sitting, Cuff Size: Large Adult)   Pulse 93   Ht 162.6 cm (64\")   Wt 107 kg (235 lb)   SpO2 97%   BMI 40.34 kg/m²   Estimated body mass index is 40.34 kg/m² as calculated from the following:    Height as of this encounter: 162.6 cm (64\").    Weight as of this encounter: 107 kg (235 lb).           Physical Exam  Constitutional:       Appearance: Normal appearance.   HENT:      Head: Normocephalic.   Cardiovascular:      Rate and Rhythm: Normal rate and regular rhythm.   Pulmonary:      Effort: Pulmonary effort is normal.      Breath sounds: Normal breath sounds.   Abdominal:      General: Abdomen is flat. Bowel sounds are normal.      Palpations: Abdomen is soft.   Musculoskeletal:         General: Normal range " of motion.      Cervical back: Neck supple.      Right lower leg: No edema.      Left lower leg: No edema.   Skin:     General: Skin is warm and dry.   Neurological:      Mental Status: She is alert and oriented to person, place, and time.      Gait: Gait is intact.   Psychiatric:         Attention and Perception: Attention normal.         Mood and Affect: Mood normal.         Speech: Speech normal.        Result Review :      CMP          8/7/2023    12:01   CMP   Glucose 70    BUN 12    Creatinine 0.68    EGFR 127.3    Sodium 140    Potassium 3.8    Chloride 103    Calcium 10.1    Total Protein 7.9    Albumin 5.0    Globulin 2.9    Total Bilirubin 0.4    Alkaline Phosphatase 67    AST (SGOT) 22    ALT (SGPT) 30    Albumin/Globulin Ratio 1.7    BUN/Creatinine Ratio 17.6    Anion Gap 12.0      CBC          8/7/2023    12:01   CBC   WBC 7.52    RBC 4.75    Hemoglobin 14.2    Hematocrit 42.3    MCV 89.1    MCH 29.9    MCHC 33.6    RDW 12.2    Platelets 438        TSH          8/7/2023    12:01   TSH   TSH 2.410      Most Recent A1C          8/7/2023    12:01   HGBA1C Most Recent   Hemoglobin A1C 5.30                   Assessment and Plan     Diagnoses and all orders for this visit:    1. Need for influenza vaccination  -     Fluzone >6 Months (7560-7999)    2. Class 3 severe obesity without serious comorbidity with body mass index (BMI) of 40.0 to 44.9 in adult, unspecified obesity type  Assessment & Plan:  Patient's (Body mass index is 40.34 kg/m².) indicates that they are morbidly/severely obese (BMI > 40 or > 35 with obesity - related health condition) with health conditions that include none . Weight is unchanged. BMI  is above average; BMI management plan is completed. We discussed portion control, increasing exercise, joining a fitness center or start home based exercise program, consulting a Bariatric surgeon, management of depression/anxiety/stress to control compensatory eating, and decreasing alcohol  consumption.     Orders:  -     Ambulatory Referral to Bariatric Surgery    3. Hx of chlamydia infection  -     Chlamydia trachomatis, Neisseria gonorrhoeae, PCR - Urine, Urine, Clean Catch    4. Attention deficit hyperactivity disorder (ADHD), combined type  Assessment & Plan:  Urine drug screen  ADHD screening questionnaire reviewed  If appropriate, start Adderall 15 mg daily  Follow-up in 4 to 6 weeks    Orders:  -     Urine Drug Screen - Urine, Clean Catch           I spent 30 minutes caring for Yuridia on this date of service. This time includes time spent by me in the following activities:preparing for the visit, reviewing tests, obtaining and/or reviewing a separately obtained history, performing a medically appropriate examination and/or evaluation , counseling and educating the patient/family/caregiver, ordering medications, tests, or procedures, referring and communicating with other health care professionals , documenting information in the medical record, and care coordination  Follow Up     Return in about 6 weeks (around 3/21/2024) for ADHD.  Patient was given instructions and counseling regarding her condition or for health maintenance advice. Please see specific information pulled into the AVS if appropriate.

## 2024-02-09 ENCOUNTER — TELEPHONE (OUTPATIENT)
Dept: FAMILY MEDICINE CLINIC | Facility: CLINIC | Age: 24
End: 2024-02-09
Payer: MEDICAID

## 2024-02-09 DIAGNOSIS — F90.2 ATTENTION DEFICIT HYPERACTIVITY DISORDER (ADHD), COMBINED TYPE: Primary | ICD-10-CM

## 2024-02-09 LAB
AMPHET+METHAMPHET UR QL: NEGATIVE
BARBITURATES UR QL SCN: NEGATIVE
BENZODIAZ UR QL SCN: NEGATIVE
CANNABINOIDS SERPL QL: NEGATIVE
COCAINE UR QL: NEGATIVE
FENTANYL UR-MCNC: NEGATIVE NG/ML
METHADONE UR QL SCN: NEGATIVE
OPIATES UR QL: NEGATIVE
OXYCODONE UR QL SCN: NEGATIVE

## 2024-02-09 RX ORDER — DEXTROAMPHETAMINE SACCHARATE, AMPHETAMINE ASPARTATE MONOHYDRATE, DEXTROAMPHETAMINE SULFATE AND AMPHETAMINE SULFATE 3.75; 3.75; 3.75; 3.75 MG/1; MG/1; MG/1; MG/1
15 CAPSULE, EXTENDED RELEASE ORAL EVERY MORNING
Qty: 30 CAPSULE | Refills: 0 | Status: SHIPPED | OUTPATIENT
Start: 2024-02-09

## 2024-02-09 NOTE — TELEPHONE ENCOUNTER
Submitted PA and awaiting response from insurance company. Will contact patient once we receive a response on coverage.

## 2024-02-09 NOTE — TELEPHONE ENCOUNTER
Left message for patient to let her know that PA was approved for this medication. Advised she should now be able to pick the medication up and to call with any questions.  Release on file.

## 2024-02-09 NOTE — TELEPHONE ENCOUNTER
Pharmacy Name: [id number]  [place] [address] AT [facility] [phone] [facility] [phone]      What medication are you calling in regards to: amphetamine-dextroamphetamine XR (Adderall XR) 15 MG 24 hr capsule     What question does the pharmacy have: NEEDING A PRIOR AUTH

## 2024-02-29 ENCOUNTER — OFFICE VISIT (OUTPATIENT)
Dept: FAMILY MEDICINE CLINIC | Facility: CLINIC | Age: 24
End: 2024-02-29
Payer: MEDICAID

## 2024-02-29 VITALS
DIASTOLIC BLOOD PRESSURE: 70 MMHG | HEART RATE: 102 BPM | OXYGEN SATURATION: 100 % | HEIGHT: 64 IN | BODY MASS INDEX: 40.63 KG/M2 | SYSTOLIC BLOOD PRESSURE: 118 MMHG | WEIGHT: 238 LBS

## 2024-02-29 DIAGNOSIS — M54.2 NECK PAIN: ICD-10-CM

## 2024-02-29 DIAGNOSIS — M41.9 SCOLIOSIS, UNSPECIFIED SCOLIOSIS TYPE, UNSPECIFIED SPINAL REGION: Primary | ICD-10-CM

## 2024-02-29 PROCEDURE — 1159F MED LIST DOCD IN RCRD: CPT | Performed by: NURSE PRACTITIONER

## 2024-02-29 PROCEDURE — 1160F RVW MEDS BY RX/DR IN RCRD: CPT | Performed by: NURSE PRACTITIONER

## 2024-02-29 PROCEDURE — 99214 OFFICE O/P EST MOD 30 MIN: CPT | Performed by: NURSE PRACTITIONER

## 2024-02-29 RX ORDER — MELOXICAM 7.5 MG/1
7.5 TABLET ORAL DAILY
Qty: 30 TABLET | Refills: 1 | Status: SHIPPED | OUTPATIENT
Start: 2024-02-29

## 2024-02-29 RX ORDER — BACLOFEN 10 MG/1
10 TABLET ORAL 3 TIMES DAILY PRN
Qty: 30 TABLET | Refills: 0 | Status: SHIPPED | OUTPATIENT
Start: 2024-02-29

## 2024-02-29 NOTE — PROGRESS NOTES
"Chief Complaint  Back Pain (Starts at the neck and shoots down her spine into the full back. This has been for the last week )    Rajendra Martinez presents to [facility]  History of Present Illness    Patient presents with back pain, reports wakes up with pain - starts at top of left side of neck and radiates into low spine. Patient reports hx scoliosis. She denies numbness or tingling to upper or lower extremities.   Patient denies constant pain with intermittent shooting pain. She reports pain started 4-5 days ago, progressing. Patient denies any injury or known exacerbating activities.     Objective   Vital Signs:  /70 (BP Location: Left arm, Patient Position: Sitting, Cuff Size: Large Adult)   Pulse 102   Ht 162.6 cm (64\")   Wt 108 kg (238 lb)   SpO2 100%   BMI 40.85 kg/m²   Estimated body mass index is 40.85 kg/m² as calculated from the following:    Height as of this encounter: 162.6 cm (64\").    Weight as of this encounter: 108 kg (238 lb).           Physical Exam  Constitutional:       Appearance: Normal appearance.   HENT:      Head: Normocephalic.   Neck:     Cardiovascular:      Rate and Rhythm: Normal rate and regular rhythm.   Pulmonary:      Effort: Pulmonary effort is normal.      Breath sounds: Normal breath sounds.   Abdominal:      General: Abdomen is flat. Bowel sounds are normal.      Palpations: Abdomen is soft.   Musculoskeletal:         General: Normal range of motion.      Cervical back: Neck supple.      Right lower leg: No edema.      Left lower leg: No edema.   Skin:     General: Skin is warm and dry.   Neurological:      Mental Status: She is alert and oriented to person, place, and time.      Gait: Gait is intact.   Psychiatric:         Attention and Perception: Attention normal.         Mood and Affect: Mood normal.         Speech: Speech normal.        Result Review :                     Assessment and Plan     Diagnoses and all " orders for this visit:    1. Scoliosis, unspecified scoliosis type, unspecified spinal region (Primary)  -     XR Spine Survey AP & Lateral; Future    2. Neck pain  -     XR Spine Survey AP & Lateral; Future    Other orders  -     baclofen (LIORESAL) 10 MG tablet; Take 1 tablet by mouth 3 (Three) Times a Day As Needed for Muscle Spasms.  Dispense: 30 tablet; Refill: 0  -     meloxicam (Mobic) 7.5 MG tablet; Take 1 tablet by mouth Daily.  Dispense: 30 tablet; Refill: 1    - XR spine ordered  - Start Baclofen 10 mg TID PRN pain/spasm  - Start Mobic 7.5 mg daily  - Consider PT referral       I spent 30 minutes caring for Yuridia on this date of service. This time includes time spent by me in the following activities:preparing for the visit, obtaining and/or reviewing a separately obtained history, performing a medically appropriate examination and/or evaluation , counseling and educating the patient/family/caregiver, ordering medications, tests, or procedures, documenting information in the medical record, and care coordination  Follow Up     Return if symptoms worsen or fail to improve.  Patient was given instructions and counseling regarding her condition or for health maintenance advice. Please see specific information pulled into the AVS if appropriate.

## 2024-03-08 DIAGNOSIS — F90.2 ATTENTION DEFICIT HYPERACTIVITY DISORDER (ADHD), COMBINED TYPE: ICD-10-CM

## 2024-03-08 RX ORDER — DEXTROAMPHETAMINE SACCHARATE, AMPHETAMINE ASPARTATE MONOHYDRATE, DEXTROAMPHETAMINE SULFATE AND AMPHETAMINE SULFATE 3.75; 3.75; 3.75; 3.75 MG/1; MG/1; MG/1; MG/1
15 CAPSULE, EXTENDED RELEASE ORAL EVERY MORNING
Qty: 30 CAPSULE | Refills: 0 | Status: SHIPPED | OUTPATIENT
Start: 2024-03-08

## 2024-03-08 RX ORDER — TRIAMCINOLONE ACETONIDE 55 UG/1
2 SPRAY, METERED NASAL DAILY
Qty: 16.5 G | Refills: 11 | Status: SHIPPED | OUTPATIENT
Start: 2024-03-08 | End: 2025-03-08

## 2024-03-08 RX ORDER — VALACYCLOVIR HYDROCHLORIDE 1 G/1
1000 TABLET, FILM COATED ORAL 2 TIMES DAILY PRN
Qty: 60 TABLET | Refills: 0 | Status: SHIPPED | OUTPATIENT
Start: 2024-03-08

## 2024-03-28 ENCOUNTER — OFFICE VISIT (OUTPATIENT)
Dept: FAMILY MEDICINE CLINIC | Facility: CLINIC | Age: 24
End: 2024-03-28
Payer: MEDICAID

## 2024-03-28 VITALS
WEIGHT: 241 LBS | SYSTOLIC BLOOD PRESSURE: 128 MMHG | DIASTOLIC BLOOD PRESSURE: 70 MMHG | HEART RATE: 91 BPM | BODY MASS INDEX: 41.15 KG/M2 | OXYGEN SATURATION: 100 % | HEIGHT: 64 IN

## 2024-03-28 DIAGNOSIS — E66.01 CLASS 3 SEVERE OBESITY WITHOUT SERIOUS COMORBIDITY WITH BODY MASS INDEX (BMI) OF 40.0 TO 44.9 IN ADULT, UNSPECIFIED OBESITY TYPE: ICD-10-CM

## 2024-03-28 DIAGNOSIS — J30.1 SEASONAL ALLERGIC RHINITIS DUE TO POLLEN: ICD-10-CM

## 2024-03-28 DIAGNOSIS — M54.2 NECK PAIN: ICD-10-CM

## 2024-03-28 DIAGNOSIS — F90.2 ATTENTION DEFICIT HYPERACTIVITY DISORDER (ADHD), COMBINED TYPE: Primary | ICD-10-CM

## 2024-03-28 DIAGNOSIS — M41.9 SCOLIOSIS, UNSPECIFIED SCOLIOSIS TYPE, UNSPECIFIED SPINAL REGION: ICD-10-CM

## 2024-03-28 PROCEDURE — 83525 ASSAY OF INSULIN: CPT | Performed by: NURSE PRACTITIONER

## 2024-03-28 RX ORDER — DEXTROAMPHETAMINE SACCHARATE, AMPHETAMINE ASPARTATE MONOHYDRATE, DEXTROAMPHETAMINE SULFATE AND AMPHETAMINE SULFATE 5; 5; 5; 5 MG/1; MG/1; MG/1; MG/1
20 CAPSULE, EXTENDED RELEASE ORAL EVERY MORNING
Qty: 30 CAPSULE | Refills: 0 | Status: SHIPPED | OUTPATIENT
Start: 2024-03-28

## 2024-03-28 NOTE — PROGRESS NOTES
"Chief Complaint  Follow-up (6 week follow up ADHD/requesting referral to ENT for allergy testing as well)    Rajendra Martinez presents to [facility]  History of Present Illness    Patient presents for follow-up visit regarding ADHD symptoms.  She presented in February with complaints of difficulty focusing in college classes, procrastination, difficulty keeping conversation, difficulty sitting still, persistent fidgeting and is easily distracted.  Patient was started on Adderall 15 mg daily. She is taking as prescribed, seemed to work initially - tapering off in effectiveness.     Patient requesting referral for allergy testing. She has chronic allergic rhinitis. Patient takes Zyrtec and Nasacort daily - has persistent nasal congestion. She is waking up gasping for air as well.     Patient presented previously with chronic, intermittent back pain, reports wakes up with pain - starts at top of left side of neck and radiates into low spine. Patient reports hx scoliosis. She denies numbness or tingling to upper or lower extremities. Patient denies constant pain with intermittent shooting pain. She reports pain started 4-5 days ago, progressing. Patient denies any injury or known exacerbating activities. XR ordered, not completed. She is taking Baclofen PRN. Patient is wanting to discuss referral for breast reduction.     Patient previously presented with concerns of obesity. She reported well-balanced diet and regular exercise. Patient's testosterone level was elevated, consistent with PCOS. Patient was started on metformin 500 mg daily and patient was advised to follow a PCOS diet. Weight is unchanged. Patient referred to Bariatric surgery for further evaluation.     Objective   Vital Signs:  /70 (BP Location: Left arm, Patient Position: Sitting, Cuff Size: Large Adult)   Pulse 91   Ht 162.6 cm (64\")   Wt 109 kg (241 lb)   SpO2 100%   BMI 41.37 kg/m²   Estimated " "body mass index is 41.37 kg/m² as calculated from the following:    Height as of this encounter: 162.6 cm (64\").    Weight as of this encounter: 109 kg (241 lb).         Physical Exam  Constitutional:       Appearance: Normal appearance.   HENT:      Head: Normocephalic.   Cardiovascular:      Rate and Rhythm: Normal rate and regular rhythm.   Pulmonary:      Effort: Pulmonary effort is normal.      Breath sounds: Normal breath sounds.   Abdominal:      General: Abdomen is flat. Bowel sounds are normal.      Palpations: Abdomen is soft.   Musculoskeletal:         General: Normal range of motion.      Cervical back: Neck supple.      Right lower leg: No edema.      Left lower leg: No edema.   Skin:     General: Skin is warm and dry.   Neurological:      Mental Status: She is alert and oriented to person, place, and time.      Gait: Gait is intact.   Psychiatric:         Attention and Perception: Attention normal.         Mood and Affect: Mood normal.         Speech: Speech normal.        Result Review :      CMP          8/7/2023    12:01   CMP   Glucose 70    BUN 12    Creatinine 0.68    EGFR 127.3    Sodium 140    Potassium 3.8    Chloride 103    Calcium 10.1    Total Protein 7.9    Albumin 5.0    Globulin 2.9    Total Bilirubin 0.4    Alkaline Phosphatase 67    AST (SGOT) 22    ALT (SGPT) 30    Albumin/Globulin Ratio 1.7    BUN/Creatinine Ratio 17.6    Anion Gap 12.0      CBC          8/7/2023    12:01   CBC   WBC 7.52    RBC 4.75    Hemoglobin 14.2    Hematocrit 42.3    MCV 89.1    MCH 29.9    MCHC 33.6    RDW 12.2    Platelets 438        TSH          8/7/2023    12:01   TSH   TSH 2.410      Most Recent A1C          8/7/2023    12:01   HGBA1C Most Recent   Hemoglobin A1C 5.30                   Assessment and Plan     Diagnoses and all orders for this visit:    1. Attention deficit hyperactivity disorder (ADHD), combined type (Primary)  Assessment & Plan:  Increase Adderall to 20 mg daily  Follow up in 6-8 " weeks      2. Seasonal allergic rhinitis due to pollen  Assessment & Plan:  Refer to ENT  Discussed with patient, likely needs a sleep study    Orders:  -     Ambulatory Referral to ENT (Otolaryngology)    3. Scoliosis, unspecified scoliosis type, unspecified spinal region    4. Neck pain  Assessment & Plan:  Proceed with XR  Consider chiropractic treatment, PT  Patient also possibly interested in breast reduction      5. Class 3 severe obesity without serious comorbidity with body mass index (BMI) of 40.0 to 44.9 in adult, unspecified obesity type  Assessment & Plan:  Patient's (Body mass index is 41.37 kg/m².) indicates that they are morbidly/severely obese (BMI > 40 or > 35 with obesity - related health condition) with health conditions that include none . Weight is unchanged. BMI  is above average; BMI management plan is completed. We discussed portion control, increasing exercise, and consulting a Bariatric surgeon.     Orders:  -     Insulin, Total           I spent 30 minutes caring for Yuridia on this date of service. This time includes time spent by me in the following activities:preparing for the visit, reviewing tests, obtaining and/or reviewing a separately obtained history, performing a medically appropriate examination and/or evaluation , counseling and educating the patient/family/caregiver, ordering medications, tests, or procedures, documenting information in the medical record, and care coordination  Follow Up     Return in about 8 weeks (around 5/23/2024) for ADHD.  Patient was given instructions and counseling regarding her condition or for health maintenance advice. Please see specific information pulled into the AVS if appropriate.

## 2024-03-28 NOTE — PROGRESS NOTES
Venipuncture Blood Specimen Collection  Venipuncture performed in the right arm by Rosalina Higginbotham MA with good hemostasis. Patient tolerated the procedure well without complications.   03/28/24   Rosalina Higginbotham MA

## 2024-03-28 NOTE — ASSESSMENT & PLAN NOTE
Patient's (Body mass index is 41.37 kg/m².) indicates that they are morbidly/severely obese (BMI > 40 or > 35 with obesity - related health condition) with health conditions that include none . Weight is unchanged. BMI  is above average; BMI management plan is completed. We discussed portion control, increasing exercise, and consulting a Bariatric surgeon.

## 2024-03-28 NOTE — ASSESSMENT & PLAN NOTE
Proceed with XR  Consider chiropractic treatment, PT  Patient also possibly interested in breast reduction

## 2024-03-31 LAB — INSULIN SERPL-ACNC: 54.8 UIU/ML (ref 2.6–24.9)

## 2024-04-10 RX ORDER — FLUCONAZOLE 150 MG/1
150 TABLET ORAL ONCE
Qty: 2 TABLET | Refills: 0 | Status: SHIPPED | OUTPATIENT
Start: 2024-04-10 | End: 2024-04-10

## 2024-04-11 DIAGNOSIS — F90.2 ATTENTION DEFICIT HYPERACTIVITY DISORDER (ADHD), COMBINED TYPE: ICD-10-CM

## 2024-04-11 RX ORDER — DEXTROAMPHETAMINE SACCHARATE, AMPHETAMINE ASPARTATE MONOHYDRATE, DEXTROAMPHETAMINE SULFATE AND AMPHETAMINE SULFATE 5; 5; 5; 5 MG/1; MG/1; MG/1; MG/1
20 CAPSULE, EXTENDED RELEASE ORAL EVERY MORNING
Qty: 30 CAPSULE | Refills: 0 | Status: SHIPPED | OUTPATIENT
Start: 2024-04-11

## 2024-04-11 NOTE — TELEPHONE ENCOUNTER
Caller: Yuridia Martinez    Relationship: Self    Best call back number: [phone]    Requested Prescriptions:   Requested Prescriptions     Pending Prescriptions Disp Refills    amphetamine-dextroamphetamine XR (Adderall XR) 20 MG 24 hr capsule 30 capsule 0     Sig: Take 1 capsule by mouth Every Morning     Pharmacy where request should be sent: [id number] - [place], [place] - [address] - [phone]  - [phone] FX     Last office visit with prescribing clinician: 3/28/2024   Last telemedicine visit with prescribing clinician: Visit date not found   Next office visit with prescribing clinician: 5/22/2024     Additional details provided by patient: REFILL WAS SENT TO [facility] A FEW WEEKS AGO, BUT THEY ARE OUT OF STOCK AND STILL DO NOT KNOW WHEN THEY WILL GET IT IN. PT CONFIRMED THAT [facility] HAS IN STOCK.     May Danielson Rep   04/11/24 15:05 EDT

## 2024-05-12 RX ORDER — SPIRONOLACTONE 25 MG/1
50 TABLET ORAL DAILY
Qty: 60 TABLET | Refills: 2 | Status: SHIPPED | OUTPATIENT
Start: 2024-05-12

## 2024-05-13 RX ORDER — SPIRONOLACTONE 25 MG/1
50 TABLET ORAL DAILY
Qty: 180 TABLET | OUTPATIENT
Start: 2024-05-13

## 2024-05-14 DIAGNOSIS — E66.01 CLASS 3 SEVERE OBESITY WITHOUT SERIOUS COMORBIDITY WITH BODY MASS INDEX (BMI) OF 40.0 TO 44.9 IN ADULT, UNSPECIFIED OBESITY TYPE: ICD-10-CM

## 2024-05-22 ENCOUNTER — HOSPITAL ENCOUNTER (OUTPATIENT)
Dept: GENERAL RADIOLOGY | Facility: HOSPITAL | Age: 24
Discharge: HOME | End: 2024-05-22
Admitting: NURSE PRACTITIONER
Payer: MEDICAID

## 2024-05-22 ENCOUNTER — OFFICE VISIT (OUTPATIENT)
Dept: FAMILY MEDICINE CLINIC | Facility: CLINIC | Age: 24
End: 2024-05-22
Payer: MEDICAID

## 2024-05-22 ENCOUNTER — LAB (OUTPATIENT)
Dept: FAMILY MEDICINE CLINIC | Facility: CLINIC | Age: 24
End: 2024-05-22
Payer: MEDICAID

## 2024-05-22 VITALS
HEIGHT: 64 IN | SYSTOLIC BLOOD PRESSURE: 132 MMHG | DIASTOLIC BLOOD PRESSURE: 80 MMHG | OXYGEN SATURATION: 99 % | BODY MASS INDEX: 39.61 KG/M2 | HEART RATE: 91 BPM | WEIGHT: 232 LBS

## 2024-05-22 DIAGNOSIS — N89.8 VAGINAL DISCHARGE: ICD-10-CM

## 2024-05-22 DIAGNOSIS — R53.82 CHRONIC FATIGUE: ICD-10-CM

## 2024-05-22 DIAGNOSIS — F90.2 ATTENTION DEFICIT HYPERACTIVITY DISORDER (ADHD), COMBINED TYPE: Primary | ICD-10-CM

## 2024-05-22 DIAGNOSIS — M41.9 SCOLIOSIS, UNSPECIFIED SCOLIOSIS TYPE, UNSPECIFIED SPINAL REGION: ICD-10-CM

## 2024-05-22 DIAGNOSIS — R06.81 WITNESSED EPISODE OF APNEA: ICD-10-CM

## 2024-05-22 DIAGNOSIS — E66.01 CLASS 3 SEVERE OBESITY WITHOUT SERIOUS COMORBIDITY WITH BODY MASS INDEX (BMI) OF 40.0 TO 44.9 IN ADULT, UNSPECIFIED OBESITY TYPE: ICD-10-CM

## 2024-05-22 DIAGNOSIS — M54.2 NECK PAIN: ICD-10-CM

## 2024-05-22 LAB
C TRACH RRNA CVX QL NAA+PROBE: NOT DETECTED
N GONORRHOEA RRNA SPEC QL NAA+PROBE: NOT DETECTED

## 2024-05-22 PROCEDURE — 87591 N.GONORRHOEAE DNA AMP PROB: CPT | Performed by: NURSE PRACTITIONER

## 2024-05-22 PROCEDURE — 1160F RVW MEDS BY RX/DR IN RCRD: CPT | Performed by: NURSE PRACTITIONER

## 2024-05-22 PROCEDURE — 99214 OFFICE O/P EST MOD 30 MIN: CPT | Performed by: NURSE PRACTITIONER

## 2024-05-22 PROCEDURE — 1159F MED LIST DOCD IN RCRD: CPT | Performed by: NURSE PRACTITIONER

## 2024-05-22 PROCEDURE — 72082 X-RAY EXAM ENTIRE SPI 2/3 VW: CPT

## 2024-05-22 PROCEDURE — 87491 CHLMYD TRACH DNA AMP PROBE: CPT | Performed by: NURSE PRACTITIONER

## 2024-05-22 RX ORDER — DEXTROAMPHETAMINE SACCHARATE, AMPHETAMINE ASPARTATE, DEXTROAMPHETAMINE SULFATE AND AMPHETAMINE SULFATE 2.5; 2.5; 2.5; 2.5 MG/1; MG/1; MG/1; MG/1
10 TABLET ORAL 2 TIMES DAILY
Qty: 60 TABLET | Refills: 0 | Status: SHIPPED | OUTPATIENT
Start: 2024-05-22

## 2024-05-22 NOTE — ASSESSMENT & PLAN NOTE
STI testing today  Patient's Pap smear is up-to-date per GYN  If negative, plan for pelvic exam and vaginal cultures

## 2024-05-22 NOTE — ASSESSMENT & PLAN NOTE
Patient's (Body mass index is 39.82 kg/m².) indicates that they are obese (BMI >30) with health conditions that include none . Weight is improving with lifestyle modifications. BMI  is above average; BMI management plan is completed. We discussed portion control and increasing exercise.

## 2024-05-22 NOTE — PROGRESS NOTES
"Chief Complaint  Follow-up (8 week follow up ADHD)    Rajendra Martinez presents to [facility]  History of Present Illness    Patient presents for f/u visit regarding ADHD. She presented in February with complaints of difficulty focusing in college classes, procrastination, difficulty keeping conversation, difficulty sitting still, persistent fidgeting and is easily distracted.  Patient was started on Adderall, taking 20 mg daily. She reports sx are stable but patient has persistent headache with higher dose of medication. Patient is having difficulty falling asleep. She reports insomnia was a problem prior to Adderall. Patient has tried and failed Melatonin, Trazodone, Sleepy Time tea. She is averaging 5 hours of sleep nightly - is always tired. Patient is currently seeing ENT, allergy testing completed. She does wake up gasping for air.     Patient also complaining of vaginal discharge over the last few weeks.  She denies any new sexual partners but does have a history of chlamydia.  Patient describes as a watery discharge, somewhat yellow in color without smell, vaginal itching or pain.    Objective   Vital Signs:  /80 (BP Location: Left arm, Patient Position: Sitting, Cuff Size: Large Adult)   Pulse 91   Ht 162.6 cm (64\")   Wt 105 kg (232 lb)   SpO2 99%   BMI 39.82 kg/m²   Estimated body mass index is 39.82 kg/m² as calculated from the following:    Height as of this encounter: 162.6 cm (64\").    Weight as of this encounter: 105 kg (232 lb).         Physical Exam  Constitutional:       Appearance: Normal appearance.   HENT:      Head: Normocephalic.   Cardiovascular:      Rate and Rhythm: Normal rate and regular rhythm.   Pulmonary:      Effort: Pulmonary effort is normal.      Breath sounds: Normal breath sounds.   Abdominal:      General: Abdomen is flat. Bowel sounds are normal.      Palpations: Abdomen is soft.   Musculoskeletal:         General: Normal " range of motion.      Cervical back: Neck supple.      Right lower leg: No edema.      Left lower leg: No edema.   Skin:     General: Skin is warm and dry.   Neurological:      Mental Status: She is alert and oriented to person, place, and time.      Gait: Gait is intact.   Psychiatric:         Attention and Perception: Attention normal.         Mood and Affect: Mood normal.         Speech: Speech normal.        Result Review :                     Assessment and Plan     Diagnoses and all orders for this visit:    1. Attention deficit hyperactivity disorder (ADHD), combined type (Primary)  Assessment & Plan:  Change Adderall to 10 mg IR twice daily    Orders:  -     amphetamine-dextroamphetamine (Adderall) 10 MG tablet; Take 1 tablet by mouth 2 (Two) Times a Day.  Dispense: 60 tablet; Refill: 0    2. Chronic fatigue  -     Home Sleep Study; Future    3. Witnessed episode of apnea  -     Home Sleep Study; Future    4. Class 3 severe obesity without serious comorbidity with body mass index (BMI) of 40.0 to 44.9 in adult, unspecified obesity type  Assessment & Plan:  Patient's (Body mass index is 39.82 kg/m².) indicates that they are obese (BMI >30) with health conditions that include none . Weight is improving with lifestyle modifications. BMI  is above average; BMI management plan is completed. We discussed portion control and increasing exercise.     Orders:  -     Home Sleep Study; Future    5. Vaginal discharge  Assessment & Plan:  STI testing today  Patient's Pap smear is up-to-date per GYN  If negative, plan for pelvic exam and vaginal cultures    Orders:  -     Chlamydia trachomatis, Neisseria gonorrhoeae, PCR - Urine, Urine, Clean Catch; Future           I spent 30 minutes caring for Yuridia on this date of service. This time includes time spent by me in the following activities:preparing for the visit, obtaining and/or reviewing a separately obtained history, performing a medically appropriate examination  and/or evaluation , counseling and educating the patient/family/caregiver, ordering medications, tests, or procedures, documenting information in the medical record, and care coordination  Follow Up     Return in about 3 months (around 8/22/2024) for Annual physical.  Patient was given instructions and counseling regarding her condition or for health maintenance advice. Please see specific information pulled into the AVS if appropriate.

## 2024-05-30 DIAGNOSIS — M54.2 NECK PAIN: ICD-10-CM

## 2024-05-30 DIAGNOSIS — M41.9 SCOLIOSIS, UNSPECIFIED SCOLIOSIS TYPE, UNSPECIFIED SPINAL REGION: Primary | ICD-10-CM

## 2024-06-04 ENCOUNTER — TELEPHONE (OUTPATIENT)
Dept: FAMILY MEDICINE CLINIC | Facility: CLINIC | Age: 24
End: 2024-06-04
Payer: MEDICAID

## 2024-06-04 NOTE — TELEPHONE ENCOUNTER
Caller: Yuridia Martinez    Relationship: Self    Best call back number: [phone]     What form or medical record are you requesting: IMMUNIZATION RECORD    Who is requesting this form or medical record from you: WORK    How would you like to receive the form or medical records (pick-up, mail, fax): UPLOAD TO [facility] [facility]  If fax, what is the fax number:   If mail, what is the address:   If pick-up, provide patient with address and location details    Timeframe paperwork needed: AS SOON AS POSSIBLE    Additional notes:

## 2024-06-12 ENCOUNTER — TELEPHONE (OUTPATIENT)
Dept: BARIATRICS/WEIGHT MGMT | Facility: CLINIC | Age: 24
End: 2024-06-12
Payer: MEDICAID

## 2024-06-24 RX ORDER — VALACYCLOVIR HYDROCHLORIDE 1 G/1
TABLET, FILM COATED ORAL
Qty: 60 TABLET | Refills: 0 | Status: SHIPPED | OUTPATIENT
Start: 2024-06-24

## 2024-07-02 ENCOUNTER — OFFICE VISIT (OUTPATIENT)
Dept: FAMILY MEDICINE CLINIC | Facility: CLINIC | Age: 24
End: 2024-07-02
Payer: MEDICAID

## 2024-07-02 VITALS
HEIGHT: 64 IN | WEIGHT: 228 LBS | BODY MASS INDEX: 38.93 KG/M2 | OXYGEN SATURATION: 98 % | SYSTOLIC BLOOD PRESSURE: 122 MMHG | DIASTOLIC BLOOD PRESSURE: 80 MMHG | HEART RATE: 102 BPM | TEMPERATURE: 97.9 F

## 2024-07-02 DIAGNOSIS — F90.2 ATTENTION DEFICIT HYPERACTIVITY DISORDER (ADHD), COMBINED TYPE: Primary | ICD-10-CM

## 2024-07-02 DIAGNOSIS — J02.9 SORE THROAT: ICD-10-CM

## 2024-07-02 DIAGNOSIS — H65.193 ACUTE EFFUSION OF BOTH MIDDLE EARS: ICD-10-CM

## 2024-07-02 DIAGNOSIS — K04.7 DENTAL ABSCESS: ICD-10-CM

## 2024-07-02 DIAGNOSIS — L65.9 ALOPECIA: ICD-10-CM

## 2024-07-02 LAB
EXPIRATION DATE: NORMAL
INTERNAL CONTROL: NORMAL
Lab: NORMAL
S PYO AG THROAT QL: NEGATIVE

## 2024-07-02 PROCEDURE — 1160F RVW MEDS BY RX/DR IN RCRD: CPT | Performed by: NURSE PRACTITIONER

## 2024-07-02 PROCEDURE — 87430 STREP A AG IA: CPT | Performed by: NURSE PRACTITIONER

## 2024-07-02 PROCEDURE — 99214 OFFICE O/P EST MOD 30 MIN: CPT | Performed by: NURSE PRACTITIONER

## 2024-07-02 PROCEDURE — 1159F MED LIST DOCD IN RCRD: CPT | Performed by: NURSE PRACTITIONER

## 2024-07-02 RX ORDER — CHLORHEXIDINE GLUCONATE ORAL RINSE 1.2 MG/ML
15 SOLUTION DENTAL 2 TIMES DAILY
Qty: 300 ML | Refills: 0 | Status: SHIPPED | OUTPATIENT
Start: 2024-07-02 | End: 2024-07-12

## 2024-07-02 RX ORDER — SPIRONOLACTONE 100 MG/1
100 TABLET, FILM COATED ORAL DAILY
Qty: 30 TABLET | Refills: 2 | Status: SHIPPED | OUTPATIENT
Start: 2024-07-02 | End: 2024-08-01

## 2024-07-02 RX ORDER — DEXTROAMPHETAMINE SACCHARATE, AMPHETAMINE ASPARTATE MONOHYDRATE, DEXTROAMPHETAMINE SULFATE AND AMPHETAMINE SULFATE 5; 5; 5; 5 MG/1; MG/1; MG/1; MG/1
20 CAPSULE, EXTENDED RELEASE ORAL EVERY MORNING
Qty: 30 CAPSULE | Refills: 0 | Status: SHIPPED | OUTPATIENT
Start: 2024-07-02

## 2024-07-02 RX ORDER — FLUTICASONE PROPIONATE 50 MCG
2 SPRAY, SUSPENSION (ML) NASAL DAILY
Qty: 1 ML | Refills: 0 | Status: SHIPPED | OUTPATIENT
Start: 2024-07-02

## 2024-07-02 RX ORDER — VITAMIN A, VITAMIN C, VITAMIN D-3, VITAMIN E, VITAMIN B-1, VITAMIN B-2, NIACIN, VITAMIN B-6, CALCIUM, IRON, ZINC, COPPER 4000; 120; 400; 22; 1.84; 3; 20; 10; 1; 12; 200; 27; 25; 2 [IU]/1; MG/1; [IU]/1; MG/1; MG/1; MG/1; MG/1; MG/1; MG/1; UG/1; MG/1; MG/1; MG/1; MG/1
1 TABLET ORAL DAILY
Qty: 30 TABLET | Refills: 1 | Status: SHIPPED | OUTPATIENT
Start: 2024-07-02

## 2024-07-02 RX ORDER — AMOXICILLIN 500 MG/1
500 CAPSULE ORAL 2 TIMES DAILY
Qty: 20 CAPSULE | Refills: 0 | Status: SHIPPED | OUTPATIENT
Start: 2024-07-02 | End: 2024-07-12

## 2024-07-02 NOTE — PROGRESS NOTES
"Chief Complaint  Earache (Bilateral ear pain for the last 4 days ), Jaw Pain (Moves from ears down into jaw ), and Sore Throat (Started with ear pain as well )    Subjective        Yuridia Martinez presents to [facility]  History of Present Illness    Patient presents with c/o bilateral ear pain that moves into bilateral jaws and sore throat.  She denies fever.  Symptoms started approximately 4 days ago.  Patient states that she feels like the pain is where her wisdom teeth were removed.    Alopecia, prescribed Aldactone 50 mg daily.  She reports hair loss slowed initially but has since increased.  Patient does have dry scalp, previously seen by dermatology and prescribed a shampoo but has since stopped using. She also has hx PCOS.     Patient is taking Adderall 10 mg twice daily.  She was previously on extended release medication and feels like it was more effective.  Patient requesting to return to 20 mg XR daily.    Objective   Vital Signs:  /80 (BP Location: Left arm, Patient Position: Sitting, Cuff Size: Large Adult)   Pulse 102   Temp 97.9 °F (36.6 °C) (Oral)   Ht 162.6 cm (64\")   Wt 103 kg (228 lb)   SpO2 98%   BMI 39.14 kg/m²   Estimated body mass index is 39.14 kg/m² as calculated from the following:    Height as of this encounter: 162.6 cm (64\").    Weight as of this encounter: 103 kg (228 lb).             Physical Exam  Constitutional:       Appearance: Normal appearance.   HENT:      Head: Normocephalic.      Mouth/Throat:      Dentition: Dental abscesses present.      Pharynx: Posterior oropharyngeal erythema present.   Cardiovascular:      Rate and Rhythm: Normal rate and regular rhythm.   Pulmonary:      Effort: Pulmonary effort is normal.      Breath sounds: Normal breath sounds.   Abdominal:      General: Abdomen is flat. Bowel sounds are normal.      Palpations: Abdomen is soft.   Musculoskeletal:         General: Normal range of motion.      Cervical back: " Neck supple.      Right lower leg: No edema.      Left lower leg: No edema.   Skin:     General: Skin is warm and dry.      Comments: Dry scalp   Neurological:      Mental Status: She is alert and oriented to person, place, and time.      Gait: Gait is intact.   Psychiatric:         Attention and Perception: Attention normal.         Mood and Affect: Mood normal.         Speech: Speech normal.        Result Review :                     Assessment and Plan     Diagnoses and all orders for this visit:    1. Attention deficit hyperactivity disorder (ADHD), combined type (Primary)  -     amphetamine-dextroamphetamine XR (Adderall XR) 20 MG 24 hr capsule; Take 1 capsule by mouth Every Morning  Dispense: 30 capsule; Refill: 0    2. Acute effusion of both middle ears  Assessment & Plan:  Start Flonase nasal spray 2 sprays each nostril daily    Orders:  -     fluticasone (FLONASE) 50 MCG/ACT nasal spray; 2 sprays into the nostril(s) as directed by provider Daily. Indications: Allergic Rhinitis, Stuffy Nose  Dispense: 1 mL; Refill: 0    3. Sore throat  Assessment & Plan:  Rapid strep test is negative    Orders:  -     POCT rapid strep A    4. Dental abscess  Assessment & Plan:  Amoxicillin 500 mg BID x 10 days  Peridex solution BID    Orders:  -     amoxicillin (AMOXIL) 500 MG capsule; Take 1 capsule by mouth 2 (Two) Times a Day for 10 days.  Dispense: 20 capsule; Refill: 0  -     chlorhexidine (Peridex) 0.12 % solution; Apply 15 mL to the mouth or throat 2 (Two) Times a Day for 10 days.  Dispense: 300 mL; Refill: 0    5. Alopecia  Assessment & Plan:  Increase Aldactone to 100 mg daily  Recommended follow up with Dermatology    Orders:  -     spironolactone (Aldactone) 100 MG tablet; Take 1 tablet by mouth Daily for 30 days.  Dispense: 30 tablet; Refill: 2    Other orders  -     Prenatal Vit-Fe Fumarate-FA (Prenatal Vitamin Plus Low Iron) 27-1 MG tablet; Take 1 tablet by mouth Daily.  Dispense: 30 tablet; Refill: 1            I spent 30 minutes caring for Yuridia on this date of service. This time includes time spent by me in the following activities:preparing for the visit, reviewing tests, obtaining and/or reviewing a separately obtained history, performing a medically appropriate examination and/or evaluation , counseling and educating the patient/family/caregiver, ordering medications, tests, or procedures, documenting information in the medical record, and care coordination  Follow Up     Return for Next scheduled follow up.  Patient was given instructions and counseling regarding her condition or for health maintenance advice. Please see specific information pulled into the AVS if appropriate.

## 2024-07-30 ENCOUNTER — TELEPHONE (OUTPATIENT)
Dept: BARIATRICS/WEIGHT MGMT | Facility: CLINIC | Age: 24
End: 2024-07-30
Payer: MEDICAID

## 2024-07-30 NOTE — TELEPHONE ENCOUNTER
Called to reschedule intake appt, as pt stated she was unable to get off work today 7.30.24. LMOVM to call office back to set up appt. As of now next appt open would be 8.27.24

## 2024-08-23 RX ORDER — FLUCONAZOLE 150 MG/1
150 TABLET ORAL DAILY
Qty: 3 TABLET | Refills: 0 | OUTPATIENT
Start: 2024-08-23

## 2024-09-05 ENCOUNTER — OFFICE VISIT (OUTPATIENT)
Dept: FAMILY MEDICINE CLINIC | Facility: CLINIC | Age: 24
End: 2024-09-05
Payer: MEDICAID

## 2024-09-05 ENCOUNTER — LAB (OUTPATIENT)
Dept: FAMILY MEDICINE CLINIC | Facility: CLINIC | Age: 24
End: 2024-09-05
Payer: MEDICAID

## 2024-09-05 VITALS
SYSTOLIC BLOOD PRESSURE: 118 MMHG | WEIGHT: 222 LBS | DIASTOLIC BLOOD PRESSURE: 70 MMHG | BODY MASS INDEX: 39.34 KG/M2 | OXYGEN SATURATION: 100 % | HEIGHT: 63 IN | HEART RATE: 80 BPM

## 2024-09-05 DIAGNOSIS — E66.01 CLASS 3 SEVERE OBESITY WITHOUT SERIOUS COMORBIDITY WITH BODY MASS INDEX (BMI) OF 40.0 TO 44.9 IN ADULT, UNSPECIFIED OBESITY TYPE: ICD-10-CM

## 2024-09-05 DIAGNOSIS — E55.9 VITAMIN D DEFICIENCY: ICD-10-CM

## 2024-09-05 DIAGNOSIS — F90.2 ATTENTION DEFICIT HYPERACTIVITY DISORDER (ADHD), COMBINED TYPE: Primary | ICD-10-CM

## 2024-09-05 DIAGNOSIS — Z00.00 ANNUAL PHYSICAL EXAM: ICD-10-CM

## 2024-09-05 DIAGNOSIS — L65.9 ALOPECIA: ICD-10-CM

## 2024-09-05 LAB
DEPRECATED RDW RBC AUTO: 39.9 FL (ref 37–54)
ERYTHROCYTE [DISTWIDTH] IN BLOOD BY AUTOMATED COUNT: 12.1 % (ref 12.3–15.4)
HBA1C MFR BLD: 5.3 % (ref 4.8–5.6)
HCT VFR BLD AUTO: 40.9 % (ref 34–46.6)
HGB BLD-MCNC: 13.7 G/DL (ref 12–15.9)
MCH RBC QN AUTO: 30.4 PG (ref 26.6–33)
MCHC RBC AUTO-ENTMCNC: 33.5 G/DL (ref 31.5–35.7)
MCV RBC AUTO: 90.9 FL (ref 79–97)
PLATELET # BLD AUTO: 440 10*3/MM3 (ref 140–450)
PMV BLD AUTO: 9.6 FL (ref 6–12)
RBC # BLD AUTO: 4.5 10*6/MM3 (ref 3.77–5.28)
WBC NRBC COR # BLD AUTO: 8.11 10*3/MM3 (ref 3.4–10.8)

## 2024-09-05 PROCEDURE — 80050 GENERAL HEALTH PANEL: CPT | Performed by: NURSE PRACTITIONER

## 2024-09-05 PROCEDURE — 83036 HEMOGLOBIN GLYCOSYLATED A1C: CPT | Performed by: NURSE PRACTITIONER

## 2024-09-05 PROCEDURE — 80061 LIPID PANEL: CPT | Performed by: NURSE PRACTITIONER

## 2024-09-05 PROCEDURE — 83735 ASSAY OF MAGNESIUM: CPT | Performed by: NURSE PRACTITIONER

## 2024-09-05 PROCEDURE — 36415 COLL VENOUS BLD VENIPUNCTURE: CPT | Performed by: NURSE PRACTITIONER

## 2024-09-05 PROCEDURE — 1159F MED LIST DOCD IN RCRD: CPT | Performed by: NURSE PRACTITIONER

## 2024-09-05 PROCEDURE — 99395 PREV VISIT EST AGE 18-39: CPT | Performed by: NURSE PRACTITIONER

## 2024-09-05 PROCEDURE — 1160F RVW MEDS BY RX/DR IN RCRD: CPT | Performed by: NURSE PRACTITIONER

## 2024-09-05 PROCEDURE — 83540 ASSAY OF IRON: CPT | Performed by: NURSE PRACTITIONER

## 2024-09-05 PROCEDURE — 82306 VITAMIN D 25 HYDROXY: CPT | Performed by: NURSE PRACTITIONER

## 2024-09-05 PROCEDURE — 2014F MENTAL STATUS ASSESS: CPT | Performed by: NURSE PRACTITIONER

## 2024-09-05 PROCEDURE — 84466 ASSAY OF TRANSFERRIN: CPT | Performed by: NURSE PRACTITIONER

## 2024-09-05 PROCEDURE — 82607 VITAMIN B-12: CPT | Performed by: NURSE PRACTITIONER

## 2024-09-05 NOTE — ASSESSMENT & PLAN NOTE
Patient's (Body mass index is 39.02 kg/m².) indicates that they are obese (BMI >30) with health conditions that include dyslipidemias . Weight is unchanged. BMI  is above average; BMI management plan is completed. We discussed portion control, increasing exercise, joining a fitness center or start home based exercise program, management of depression/anxiety/stress to control compensatory eating, and decreasing alcohol consumption.

## 2024-09-05 NOTE — PROGRESS NOTES
"Chief Complaint  Annual Exam (Gets pap smear done with GYN )    Rajendra Martinez presents to [facility]  History of Present Illness    Patient presents for Annual Exam without Pap Smear. Pap smear per GYN.  Past medical history includes ADHD, depression, anxiety, hyperlipidemia, hypertension, PCOS, PTSD, scoliosis.    ADHD, stable on Adderall XR 20 mg daily.    Patient has alopecia related to PCOS, prescribed Aldactone 100 mg daily.  She is followed by Dermatology - will follow up in October.    Obesity, current BMI is 39.02. Patient is not eligible for bariatric surgery due to weight. She reports well-balanced diet and regular exercise. Patient is taking Metformin 500 mg BID.     Objective   Vital Signs:  /70 (BP Location: Left arm, Patient Position: Sitting, Cuff Size: Large Adult)   Pulse 80   Ht 160.7 cm (63.25\")   Wt 101 kg (222 lb)   SpO2 100%   BMI 39.02 kg/m²   Estimated body mass index is 39.02 kg/m² as calculated from the following:    Height as of this encounter: 160.7 cm (63.25\").    Weight as of this encounter: 101 kg (222 lb).          Physical Exam  Constitutional:       Appearance: Normal appearance.   HENT:      Head: Normocephalic.      Right Ear: Tympanic membrane normal.      Left Ear: Tympanic membrane normal.      Mouth/Throat:      Pharynx: Oropharynx is clear.   Cardiovascular:      Rate and Rhythm: Normal rate and regular rhythm.   Pulmonary:      Effort: Pulmonary effort is normal.      Breath sounds: Normal breath sounds.   Abdominal:      General: Abdomen is flat. Bowel sounds are normal.      Palpations: Abdomen is soft.   Musculoskeletal:         General: Normal range of motion.      Cervical back: Neck supple.      Right lower leg: No edema.      Left lower leg: No edema.   Skin:     General: Skin is warm and dry.   Neurological:      Mental Status: She is alert and oriented to person, place, and time.      Gait: Gait is intact. "   Psychiatric:         Attention and Perception: Attention normal.         Mood and Affect: Mood normal.         Speech: Speech normal.        Result Review :                        Assessment and Plan   Diagnoses and all orders for this visit:    1. Attention deficit hyperactivity disorder (ADHD), combined type (Primary)  Assessment & Plan:  Stable on Adderall XR 20 mg daily      2. Alopecia  -     Vitamin B12  -     Magnesium  -     Iron Profile    3. Annual physical exam  Assessment & Plan:  Labs today  Routine vision and dental screenings advised  Pap smear per GYN    Orders:  -     CBC (No Diff)  -     Comprehensive Metabolic Panel  -     Hemoglobin A1c  -     Lipid Panel  -     TSH    4. Class 3 severe obesity without serious comorbidity with body mass index (BMI) of 40.0 to 44.9 in adult, unspecified obesity type  Assessment & Plan:  Patient's (Body mass index is 39.02 kg/m².) indicates that they are obese (BMI >30) with health conditions that include dyslipidemias . Weight is unchanged. BMI  is above average; BMI management plan is completed. We discussed portion control, increasing exercise, joining a fitness center or start home based exercise program, management of depression/anxiety/stress to control compensatory eating, and decreasing alcohol consumption.       5. Vitamin D deficiency  -     Vitamin D,25-Hydroxy           I spent 30 minutes caring for Yuridia on this date of service. This time includes time spent by me in the following activities:preparing for the visit, reviewing tests, obtaining and/or reviewing a separately obtained history, performing a medically appropriate examination and/or evaluation , counseling and educating the patient/family/caregiver, ordering medications, tests, or procedures, documenting information in the medical record, and care coordination  Follow Up   Return in about 4 months (around 1/5/2025) for Obesity/Alopecia/ADHD.  Patient was given instructions and counseling  regarding her condition or for health maintenance advice. Please see specific information pulled into the AVS if appropriate.     Counseling was given to patient for the following topics: instructions for management, risk factor reductions, prognosis, patient and family education, impressions, risks and benefits of treatment options, and importance of treatment compliance . Total time of the encounter was 25 minutes and 5 minutes was spent counseling.

## 2024-09-06 ENCOUNTER — HOSPITAL ENCOUNTER (OUTPATIENT)
Facility: HOSPITAL | Age: 24
Discharge: HOME | End: 2024-09-06
Attending: EMERGENCY MEDICINE | Admitting: EMERGENCY MEDICINE
Payer: MEDICAID

## 2024-09-06 VITALS
WEIGHT: 218.7 LBS | BODY MASS INDEX: 38.75 KG/M2 | OXYGEN SATURATION: 96 % | HEART RATE: 104 BPM | TEMPERATURE: 98.2 F | SYSTOLIC BLOOD PRESSURE: 137 MMHG | DIASTOLIC BLOOD PRESSURE: 75 MMHG | HEIGHT: 63 IN | RESPIRATION RATE: 14 BRPM

## 2024-09-06 DIAGNOSIS — R19.7 DIARRHEA, UNSPECIFIED TYPE: Primary | ICD-10-CM

## 2024-09-06 DIAGNOSIS — Z20.822 EXPOSURE TO COVID-19 VIRUS: ICD-10-CM

## 2024-09-06 LAB
25(OH)D3 SERPL-MCNC: 38 NG/ML (ref 30–100)
ALBUMIN SERPL-MCNC: 4.9 G/DL (ref 3.5–5.2)
ALBUMIN/GLOB SERPL: 1.8 G/DL
ALP SERPL-CCNC: 57 U/L (ref 39–117)
ALT SERPL W P-5'-P-CCNC: 33 U/L (ref 1–33)
ANION GAP SERPL CALCULATED.3IONS-SCNC: 12.5 MMOL/L (ref 5–15)
AST SERPL-CCNC: 26 U/L (ref 1–32)
BILIRUB SERPL-MCNC: 0.3 MG/DL (ref 0–1.2)
BUN SERPL-MCNC: 8 MG/DL (ref 6–20)
BUN/CREAT SERPL: 10.1 (ref 7–25)
CALCIUM SPEC-SCNC: 10.1 MG/DL (ref 8.6–10.5)
CHLORIDE SERPL-SCNC: 101 MMOL/L (ref 98–107)
CHOLEST SERPL-MCNC: 171 MG/DL (ref 0–200)
CO2 SERPL-SCNC: 23.5 MMOL/L (ref 22–29)
CREAT SERPL-MCNC: 0.79 MG/DL (ref 0.57–1)
EGFRCR SERPLBLD CKD-EPI 2021: 108.6 ML/MIN/1.73
FLUAV SUBTYP SPEC NAA+PROBE: NOT DETECTED
FLUBV RNA ISLT QL NAA+PROBE: NOT DETECTED
GLOBULIN UR ELPH-MCNC: 2.8 GM/DL
GLUCOSE SERPL-MCNC: 72 MG/DL (ref 65–99)
HDLC SERPL-MCNC: 47 MG/DL (ref 40–60)
IRON 24H UR-MRATE: 75 MCG/DL (ref 37–145)
IRON SATN MFR SERPL: 17 % (ref 20–50)
LDLC SERPL CALC-MCNC: 108 MG/DL (ref 0–100)
LDLC/HDLC SERPL: 2.28 {RATIO}
MAGNESIUM SERPL-MCNC: 2.1 MG/DL (ref 1.6–2.6)
POTASSIUM SERPL-SCNC: 4.5 MMOL/L (ref 3.5–5.2)
PROT SERPL-MCNC: 7.7 G/DL (ref 6–8.5)
SARS-COV-2 RNA RESP QL NAA+PROBE: NOT DETECTED
SODIUM SERPL-SCNC: 137 MMOL/L (ref 136–145)
TIBC SERPL-MCNC: 451 MCG/DL (ref 298–536)
TRANSFERRIN SERPL-MCNC: 303 MG/DL (ref 200–360)
TRIGL SERPL-MCNC: 84 MG/DL (ref 0–150)
TSH SERPL DL<=0.05 MIU/L-ACNC: 1.69 UIU/ML (ref 0.27–4.2)
VIT B12 BLD-MCNC: 440 PG/ML (ref 211–946)
VLDLC SERPL-MCNC: 16 MG/DL (ref 5–40)

## 2024-09-06 PROCEDURE — 87636 SARSCOV2 & INF A&B AMP PRB: CPT

## 2024-09-06 PROCEDURE — G0463 HOSPITAL OUTPT CLINIC VISIT: HCPCS

## 2024-09-06 PROCEDURE — 99213 OFFICE O/P EST LOW 20 MIN: CPT

## 2024-09-06 NOTE — FSED PROVIDER NOTE
"Subjective   History of Present Illness  Chief Complaint: Diarrhea, hot flash      HPI: Patient is a 24-year-old female presents by private vehicle she states yesterday she had an episode of \"hot\" sensation followed by abdominal cramping and diarrhea.  She has not had more than 5 episodes in the last 24 hours she has now had hematochezia or melena.  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    PCP: Leola    History provided by:  Patient      Review of Systems  See above as noted    Past Medical History:   Diagnosis Date    ADHD (attention deficit hyperactivity disorder)     Anxiety     Depression     High triglycerides     Hypertension     PCOS (polycystic ovarian syndrome)     PTSD (post-traumatic stress disorder)     Scoliosis     Shortness of breath at rest     Visual impairment        No Known Allergies    Past Surgical History:   Procedure Laterality Date    ADENOIDECTOMY      FRACTURE SURGERY      SPLENECTOMY, PARTIAL      TONSILLECTOMY         Family History   Problem Relation Age of Onset    Drug abuse Mother     Hypertension Father     Heart disease Father     Thyroid disease Father     Obesity Father     Stroke Father     Heart attack Father     Hypertension Maternal Grandmother     Stroke Maternal Grandfather     Hypertension Maternal Grandfather     Diabetes Maternal Grandfather     Cancer Paternal Grandfather     Hypertension Paternal Grandfather     Diabetes Other        Social History     Socioeconomic History    Marital status: Single   Tobacco Use    Smoking status: Never     Passive exposure: Never    Smokeless tobacco: Never   Vaping Use    Vaping status: Never Used   Substance and Sexual Activity    Alcohol use: Yes     Comment: occ    Drug use: Never    Sexual activity: Yes     Partners: Male     Birth control/protection: None           Objective   Physical Exam  Vitals reviewed.   Constitutional:       General: She is not in acute distress.     " "Appearance: She is obese. She is not toxic-appearing.   HENT:      Head: Normocephalic.      Mouth/Throat:      Mouth: Mucous membranes are moist.      Pharynx: Oropharynx is clear.   Eyes:      Pupils: Pupils are equal, round, and reactive to light.   Cardiovascular:      Rate and Rhythm: Normal rate.   Pulmonary:      Effort: Pulmonary effort is normal.   Abdominal:      General: Bowel sounds are normal. There is no distension.      Palpations: Abdomen is soft.      Tenderness: There is no abdominal tenderness.   Skin:     Capillary Refill: Capillary refill takes less than 2 seconds.   Neurological:      General: No focal deficit present.      Mental Status: She is alert and oriented to person, place, and time. Mental status is at baseline.         Procedures           ED Course      /75 (BP Location: Right arm, Patient Position: Sitting)   Pulse 104   Temp 98.2 °F (36.8 °C) (Oral)   Resp 14   Ht 160 cm (63\")   Wt 99.2 kg (218 lb 11.2 oz)   SpO2 96%   BMI 38.74 kg/m²   Labs Reviewed   COVID-19 AND FLU A/B, NP SWAB IN TRANSPORT MEDIA 1 HR TAT - Normal    Narrative:     Fact sheet for providers: https://www.fda.gov/media/373028/download    Fact sheet for patients: https://www.fda.gov/media/620791/download    Test performed by PCR.     Medications - No data to display  No radiology results for the last day                                       Medical Decision Making  Patient presented with above complaints, noted physical exam no acute distress vital signs are stable COVID and influenza testing were negative.  Discussed other viral cause of presenting symptoms.  We discussed over-the-counter treatment as well as nonpharmacological treatment of her symptoms educated on worrisome symptoms to monitor for and encouraged follow-up with PCP.  Patient gave verbal understanding, denied further questions or complaints, discharge.    Labs completed yesterday by PCP CBC and CMP were unremarkable.    /75 (BP " "Location: Right arm, Patient Position: Sitting)   Pulse 104   Temp 98.2 °F (36.8 °C) (Oral)   Resp 14   Ht 160 cm (63\")   Wt 99.2 kg (218 lb 11.2 oz)   SpO2 96%   BMI 38.74 kg/m²      Chart review: 9/5/2024 was seen by her PCP related to annual exam  ADHD, depression, anxiety, hyperlipidemia, hypertension, PCOS, PTSD, scoliosis.      Past Medical History:  No date: ADHD (attention deficit hyperactivity disorder)  No date: Anxiety  No date: Depression  No date: High triglycerides  No date: Hypertension  No date: PCOS (polycystic ovarian syndrome)  No date: PTSD (post-traumatic stress disorder)  No date: Scoliosis  No date: Shortness of breath at rest  No date: Visual impairment     Medications: Medications - No data to display   Radiology interpretation: Not warranted for this visit  Lab interpretation:  Labs viewed by me significant for, Labs Reviewed  COVID-19 AND FLU A/B, NP SWAB IN TRANSPORT MEDIA 1 HR TAT - Normal      Note Disclaimer: At [facility], we believe that sharing information builds trust and better  relationships. You are receiving this note because you recently visited [facility]. It is possible you will see health information before a provider has talked with you about it. This kind of information can be easy to misunderstand. To help you fully understand what it means for your health, we urge you to discuss this note with your provider.     Part of this note may be an electronic transcription/translation of spoken language to printed text using the Dragon Dictation System.    Appropriate PPE worn during exam.       Problems Addressed:  Diarrhea, unspecified type: acute illness or injury  Exposure to COVID-19 virus: acute illness or injury    Amount and/or Complexity of Data Reviewed  External Data Reviewed: notes.  Labs: ordered. Decision-making details documented in ED Course.        Final diagnoses:   Diarrhea, unspecified type   Exposure to COVID-19 virus       ED Disposition  ED " Disposition       ED Disposition   Discharge    Condition   Stable    Comment   --               Mercedes Bhagat, APRN  [address]  [address]  [place] [address] [phone]               Medication List      No changes were made to your prescriptions during this visit.

## 2024-09-06 NOTE — Clinical Note
[facility] FS[facility] [place] [id number] [place] [address] [place] [address]  Phone: [phone]    Yuridia Martinez was seen and treated in our emergency department on 9/6/2024.  She may return to work on 09/07/2024.         Thank you for choosing [facility].    Mario Ty MD

## 2024-09-06 NOTE — DISCHARGE INSTRUCTIONS
If you are having more than 8-10 stools per day can use over-the-counter Imodium or Pepto.    Increase water  Follow-up with PCP  Return as needed

## 2024-09-09 RX ORDER — CEPHALEXIN 500 MG/1
500 CAPSULE ORAL 2 TIMES DAILY
Qty: 14 CAPSULE | Refills: 0 | Status: SHIPPED | OUTPATIENT
Start: 2024-09-09

## 2024-09-21 DIAGNOSIS — L65.9 ALOPECIA: ICD-10-CM

## 2024-09-23 RX ORDER — SPIRONOLACTONE 100 MG/1
100 TABLET, FILM COATED ORAL DAILY
Qty: 30 TABLET | Refills: 2 | Status: SHIPPED | OUTPATIENT
Start: 2024-09-23

## 2024-10-07 DIAGNOSIS — E66.813 CLASS 3 SEVERE OBESITY WITHOUT SERIOUS COMORBIDITY WITH BODY MASS INDEX (BMI) OF 40.0 TO 44.9 IN ADULT, UNSPECIFIED OBESITY TYPE: ICD-10-CM

## 2024-10-07 DIAGNOSIS — E66.01 CLASS 3 SEVERE OBESITY WITHOUT SERIOUS COMORBIDITY WITH BODY MASS INDEX (BMI) OF 40.0 TO 44.9 IN ADULT, UNSPECIFIED OBESITY TYPE: ICD-10-CM

## 2024-10-14 DIAGNOSIS — L65.9 ALOPECIA: ICD-10-CM

## 2024-10-14 DIAGNOSIS — H65.193 ACUTE EFFUSION OF BOTH MIDDLE EARS: ICD-10-CM

## 2024-10-14 DIAGNOSIS — F90.2 ATTENTION DEFICIT HYPERACTIVITY DISORDER (ADHD), COMBINED TYPE: ICD-10-CM

## 2024-10-14 RX ORDER — DEXTROAMPHETAMINE SACCHARATE, AMPHETAMINE ASPARTATE MONOHYDRATE, DEXTROAMPHETAMINE SULFATE AND AMPHETAMINE SULFATE 5; 5; 5; 5 MG/1; MG/1; MG/1; MG/1
20 CAPSULE, EXTENDED RELEASE ORAL EVERY MORNING
Qty: 30 CAPSULE | Refills: 0 | Status: SHIPPED | OUTPATIENT
Start: 2024-10-14 | End: 2024-10-15 | Stop reason: SDUPTHER

## 2024-10-14 RX ORDER — FLUTICASONE PROPIONATE 50 UG/1
2 SPRAY, METERED NASAL DAILY
Qty: 1 ML | Refills: 0 | Status: SHIPPED | OUTPATIENT
Start: 2024-10-14

## 2024-10-14 RX ORDER — SPIRONOLACTONE 100 MG/1
100 TABLET, FILM COATED ORAL DAILY
Qty: 30 TABLET | Refills: 2 | Status: SHIPPED | OUTPATIENT
Start: 2024-10-14

## 2024-10-14 RX ORDER — VALACYCLOVIR HYDROCHLORIDE 1 G/1
1000 TABLET, FILM COATED ORAL 2 TIMES DAILY PRN
Qty: 60 TABLET | Refills: 0 | Status: SHIPPED | OUTPATIENT
Start: 2024-10-14

## 2024-10-14 NOTE — TELEPHONE ENCOUNTER
Caller: Yuridia Martinez    Relationship: Self    Best call back number: [phone]     Requested Prescriptions:   Requested Prescriptions     Pending Prescriptions Disp Refills    valACYclovir (VALTREX) 1000 MG tablet 60 tablet 0    spironolactone (ALDACTONE) 100 MG tablet 30 tablet 2     Sig: Take 1 tablet by mouth Daily.    fluticasone (FLONASE) 50 MCG/ACT nasal spray 1 mL 0     Sig: Administer 2 sprays into the nostril(s) as directed by provider Daily. Indications: Allergic Rhinitis, Stuffy Nose    amphetamine-dextroamphetamine XR (Adderall XR) 20 MG 24 hr capsule 30 capsule 0     Sig: Take 1 capsule by mouth Every Morning        Pharmacy where request should be sent: [id number] [place] [id number] [facility] [phone] [facility] [phone]      Last office visit with prescribing clinician: 9/5/2024   Last telemedicine visit with prescribing clinician: Visit date not found   Next office visit with prescribing clinician: 1/6/2025     Additional details provided by patient: PATIENT IS OUT OF THIS MEDICATION    Does the patient have less than a 3 day supply:  [x] Yes  [] No    May Mijares Rep   10/14/24 09:16 EDT

## 2024-10-15 ENCOUNTER — TELEPHONE (OUTPATIENT)
Dept: FAMILY MEDICINE CLINIC | Facility: CLINIC | Age: 24
End: 2024-10-15

## 2024-10-15 ENCOUNTER — OFFICE VISIT (OUTPATIENT)
Dept: FAMILY MEDICINE CLINIC | Facility: CLINIC | Age: 24
End: 2024-10-15
Payer: MEDICAID

## 2024-10-15 VITALS
HEIGHT: 63 IN | DIASTOLIC BLOOD PRESSURE: 80 MMHG | SYSTOLIC BLOOD PRESSURE: 118 MMHG | OXYGEN SATURATION: 98 % | BODY MASS INDEX: 39.69 KG/M2 | WEIGHT: 224 LBS | HEART RATE: 68 BPM

## 2024-10-15 DIAGNOSIS — H66.90 ACUTE OTITIS MEDIA, UNSPECIFIED OTITIS MEDIA TYPE: Primary | ICD-10-CM

## 2024-10-15 DIAGNOSIS — F90.2 ATTENTION DEFICIT HYPERACTIVITY DISORDER (ADHD), COMBINED TYPE: ICD-10-CM

## 2024-10-15 PROCEDURE — 99213 OFFICE O/P EST LOW 20 MIN: CPT | Performed by: NURSE PRACTITIONER

## 2024-10-15 PROCEDURE — 1160F RVW MEDS BY RX/DR IN RCRD: CPT | Performed by: NURSE PRACTITIONER

## 2024-10-15 PROCEDURE — 1159F MED LIST DOCD IN RCRD: CPT | Performed by: NURSE PRACTITIONER

## 2024-10-15 RX ORDER — AMOXICILLIN 500 MG/1
500 CAPSULE ORAL 2 TIMES DAILY
Qty: 20 CAPSULE | Refills: 0 | Status: SHIPPED | OUTPATIENT
Start: 2024-10-15 | End: 2024-10-25

## 2024-10-15 RX ORDER — DEXTROAMPHETAMINE SACCHARATE, AMPHETAMINE ASPARTATE MONOHYDRATE, DEXTROAMPHETAMINE SULFATE AND AMPHETAMINE SULFATE 5; 5; 5; 5 MG/1; MG/1; MG/1; MG/1
20 CAPSULE, EXTENDED RELEASE ORAL EVERY MORNING
Qty: 30 CAPSULE | Refills: 0 | Status: SHIPPED | OUTPATIENT
Start: 2024-10-15

## 2024-10-15 NOTE — PROGRESS NOTES
"Chief Complaint  Earache (Bilateral ear pain for the last 2 weeks ) and Headache (For the last 2 weeks )    Subjective        Yuridia Martinez presents to [facility]  History of Present Illness    Patient presents with complaints of bilateral ear pain and headache, ongoing for 2 weeks.  She denies fever, cough, chest pain, wheezing or dyspnea.    Objective   Vital Signs:  /80 (BP Location: Left arm, Patient Position: Sitting, Cuff Size: Large Adult)   Pulse 68   Ht 160 cm (63\")   Wt 102 kg (224 lb)   SpO2 98%   BMI 39.68 kg/m²   Estimated body mass index is 39.68 kg/m² as calculated from the following:    Height as of this encounter: 160 cm (63\").    Weight as of this encounter: 102 kg (224 lb).          Physical Exam  Constitutional:       Appearance: Normal appearance.   HENT:      Head: Normocephalic.      Right Ear: A middle ear effusion is present.      Left Ear: Tympanic membrane is erythematous.   Cardiovascular:      Rate and Rhythm: Normal rate and regular rhythm.   Pulmonary:      Effort: Pulmonary effort is normal.      Breath sounds: Normal breath sounds.   Abdominal:      General: Abdomen is flat. Bowel sounds are normal.      Palpations: Abdomen is soft.   Musculoskeletal:         General: Normal range of motion.      Cervical back: Neck supple.      Right lower leg: No edema.      Left lower leg: No edema.   Skin:     General: Skin is warm and dry.   Neurological:      Mental Status: She is alert and oriented to person, place, and time.      Gait: Gait is intact.   Psychiatric:         Attention and Perception: Attention normal.         Mood and Affect: Mood normal.         Speech: Speech normal.        Result Review :                Assessment and Plan   Diagnoses and all orders for this visit:    1. Acute otitis media, unspecified otitis media type (Primary)  -     amoxicillin (AMOXIL) 500 MG capsule; Take 1 capsule by mouth 2 (Two) Times a Day for 10 days.  " Dispense: 20 capsule; Refill: 0           I spent 15 minutes caring for Yuridia on this date of service. This time includes time spent by me in the following activities:preparing for the visit, obtaining and/or reviewing a separately obtained history, performing a medically appropriate examination and/or evaluation , counseling and educating the patient/family/caregiver, ordering medications, tests, or procedures, documenting information in the medical record, and care coordination  Follow Up   Return if symptoms worsen or fail to improve.  Patient was given instructions and counseling regarding her condition or for health maintenance advice. Please see specific information pulled into the AVS if appropriate.

## 2024-10-15 NOTE — TELEPHONE ENCOUNTER
Caller: Yuridia Martinez    Relationship to patient: Self    Best call back number: [phone]    Patient is needing: PATIENT HAD HER amphetamine-dextroamphetamine XR (Adderall XR) 20 MG 24 hr capsule  CALLED IN TO [facility] YESTERDAY 10/14/24 BUT THEY DO NOT HAVE IT DUE TO A  BACK ORDER. PATIENT IS WANTING IT SWITCHED TO THE BELOW PHARMACY AS THEY HAVE IT IN STOCK. PATIENT IS OUT OF MEDICATION CURRENTLY AND HAS BEEN THE LAST 2 DAYS.    PREFERRED PHARMACY:[facility]/pharm[facility] #[id number] - [place], [place] - [address] - [phone] [facility] [phone]  [phone]

## 2024-10-31 RX ORDER — SEMAGLUTIDE 0.25 MG/.5ML
0.25 INJECTION, SOLUTION SUBCUTANEOUS WEEKLY
Qty: 2 ML | Refills: 0 | Status: SHIPPED | OUTPATIENT
Start: 2024-10-31 | End: 2024-11-30

## 2024-11-06 RX ORDER — CEPHALEXIN 500 MG/1
500 CAPSULE ORAL 2 TIMES DAILY
Qty: 14 CAPSULE | Refills: 0 | Status: SHIPPED | OUTPATIENT
Start: 2024-11-06

## 2024-11-14 RX ORDER — PNV,CALCIUM 72/IRON/FOLIC ACID 27 MG-1 MG
1 TABLET ORAL DAILY
Qty: 30 TABLET | Refills: 1 | Status: SHIPPED | OUTPATIENT
Start: 2024-11-14

## 2024-11-23 DIAGNOSIS — H65.193 ACUTE EFFUSION OF BOTH MIDDLE EARS: ICD-10-CM

## 2024-11-25 RX ORDER — FLUTICASONE PROPIONATE 50 MCG
SPRAY, SUSPENSION (ML) NASAL
Qty: 16 G | Refills: 0 | Status: SHIPPED | OUTPATIENT
Start: 2024-11-25

## 2024-11-27 ENCOUNTER — TELEPHONE (OUTPATIENT)
Dept: FAMILY MEDICINE CLINIC | Facility: CLINIC | Age: 24
End: 2024-11-27
Payer: MEDICAID

## 2024-11-27 NOTE — TELEPHONE ENCOUNTER
Attempted to call pt to confirm 12/2 appt, no answer: per verbal left msg for pt to call to confirm or if needing to reschedule

## 2024-12-02 ENCOUNTER — OFFICE VISIT (OUTPATIENT)
Dept: FAMILY MEDICINE CLINIC | Facility: CLINIC | Age: 24
End: 2024-12-02
Payer: MEDICAID

## 2024-12-02 ENCOUNTER — TELEPHONE (OUTPATIENT)
Dept: FAMILY MEDICINE CLINIC | Facility: CLINIC | Age: 24
End: 2024-12-02

## 2024-12-02 VITALS
HEIGHT: 63 IN | OXYGEN SATURATION: 100 % | BODY MASS INDEX: 37.56 KG/M2 | DIASTOLIC BLOOD PRESSURE: 80 MMHG | HEART RATE: 94 BPM | WEIGHT: 212 LBS | SYSTOLIC BLOOD PRESSURE: 128 MMHG

## 2024-12-02 DIAGNOSIS — Z72.51 UNPROTECTED SEX: ICD-10-CM

## 2024-12-02 DIAGNOSIS — F90.2 ATTENTION DEFICIT HYPERACTIVITY DISORDER (ADHD), COMBINED TYPE: ICD-10-CM

## 2024-12-02 DIAGNOSIS — N89.8 VAGINAL DISCHARGE: Primary | ICD-10-CM

## 2024-12-02 LAB
BILIRUB BLD-MCNC: NEGATIVE MG/DL
C TRACH RRNA CVX QL NAA+PROBE: NOT DETECTED
CLARITY, POC: ABNORMAL
COLOR UR: YELLOW
GLUCOSE UR STRIP-MCNC: NEGATIVE MG/DL
KETONES UR QL: NEGATIVE
LEUKOCYTE EST, POC: ABNORMAL
N GONORRHOEA RRNA SPEC QL NAA+PROBE: NOT DETECTED
NITRITE UR-MCNC: NEGATIVE MG/ML
PH UR: 6 [PH] (ref 5–8)
PROT UR STRIP-MCNC: NEGATIVE MG/DL
RBC # UR STRIP: ABNORMAL /UL
SP GR UR: 1.02 (ref 1–1.03)
TRICHOMONAS VAGINALIS PCR: NOT DETECTED
UROBILINOGEN UR QL: NORMAL

## 2024-12-02 PROCEDURE — 87147 CULTURE TYPE IMMUNOLOGIC: CPT | Performed by: NURSE PRACTITIONER

## 2024-12-02 PROCEDURE — 87661 TRICHOMONAS VAGINALIS AMPLIF: CPT | Performed by: NURSE PRACTITIONER

## 2024-12-02 PROCEDURE — 1159F MED LIST DOCD IN RCRD: CPT | Performed by: NURSE PRACTITIONER

## 2024-12-02 PROCEDURE — 86592 SYPHILIS TEST NON-TREP QUAL: CPT | Performed by: NURSE PRACTITIONER

## 2024-12-02 PROCEDURE — 86803 HEPATITIS C AB TEST: CPT | Performed by: NURSE PRACTITIONER

## 2024-12-02 PROCEDURE — 87491 CHLMYD TRACH DNA AMP PROBE: CPT | Performed by: NURSE PRACTITIONER

## 2024-12-02 PROCEDURE — 87070 CULTURE OTHR SPECIMN AEROBIC: CPT | Performed by: NURSE PRACTITIONER

## 2024-12-02 PROCEDURE — 1160F RVW MEDS BY RX/DR IN RCRD: CPT | Performed by: NURSE PRACTITIONER

## 2024-12-02 PROCEDURE — 99214 OFFICE O/P EST MOD 30 MIN: CPT | Performed by: NURSE PRACTITIONER

## 2024-12-02 PROCEDURE — 87205 SMEAR GRAM STAIN: CPT | Performed by: NURSE PRACTITIONER

## 2024-12-02 PROCEDURE — 87591 N.GONORRHOEAE DNA AMP PROB: CPT | Performed by: NURSE PRACTITIONER

## 2024-12-02 PROCEDURE — 87086 URINE CULTURE/COLONY COUNT: CPT | Performed by: NURSE PRACTITIONER

## 2024-12-02 PROCEDURE — 36415 COLL VENOUS BLD VENIPUNCTURE: CPT | Performed by: NURSE PRACTITIONER

## 2024-12-02 PROCEDURE — G0432 EIA HIV-1/HIV-2 SCREEN: HCPCS | Performed by: NURSE PRACTITIONER

## 2024-12-02 RX ORDER — DEXTROAMPHETAMINE SACCHARATE, AMPHETAMINE ASPARTATE MONOHYDRATE, DEXTROAMPHETAMINE SULFATE AND AMPHETAMINE SULFATE 5; 5; 5; 5 MG/1; MG/1; MG/1; MG/1
20 CAPSULE, EXTENDED RELEASE ORAL EVERY MORNING
Qty: 30 CAPSULE | Refills: 0 | Status: CANCELLED | OUTPATIENT
Start: 2024-12-02

## 2024-12-02 NOTE — PROGRESS NOTES
"Chief Complaint  Vaginal Pain (Some small amount of vaginal bleeding on and off/does not normally have a period ) and Vaginal Itching (Improved, but was really bad last week with some dischrage)    Subjective        Yuridia Martinez presents to [facility]  History of Present Illness    Patient presents with complaints of vaginal bleeding, vaginal itching and discharge.  She reports the discharge and itching were present last week, since resolved.  Patient does report new sexual partner within the last 3 to 4 weeks, unprotected.    Objective   Vital Signs:  /80 (BP Location: Left arm, Patient Position: Sitting, Cuff Size: Large Adult)   Pulse 94   Ht 160 cm (63\")   Wt 96.2 kg (212 lb)   SpO2 100%   BMI 37.55 kg/m²   Estimated body mass index is 37.55 kg/m² as calculated from the following:    Height as of this encounter: 160 cm (63\").    Weight as of this encounter: 96.2 kg (212 lb).          Physical Exam  Constitutional:       Appearance: Normal appearance.   HENT:      Head: Normocephalic.   Cardiovascular:      Rate and Rhythm: Normal rate and regular rhythm.   Pulmonary:      Effort: Pulmonary effort is normal.      Breath sounds: Normal breath sounds.   Abdominal:      General: Abdomen is flat. Bowel sounds are normal.      Palpations: Abdomen is soft.   Genitourinary:     Labia:         Right: No rash.         Left: No rash.       Vagina: Vaginal discharge and bleeding present. No tenderness.      Cervix: Normal.      Adnexa:         Right: No mass or tenderness.          Left: Tenderness present. No mass.     Musculoskeletal:         General: Normal range of motion.      Cervical back: Neck supple.      Right lower leg: No edema.      Left lower leg: No edema.   Skin:     General: Skin is warm and dry.   Neurological:      Mental Status: She is alert and oriented to person, place, and time.      Gait: Gait is intact.   Psychiatric:         Attention and Perception: " Attention normal.         Mood and Affect: Mood normal.         Speech: Speech normal.        Result Review :                Assessment and Plan   Diagnoses and all orders for this visit:    1. Vaginal discharge (Primary)  -     Chlamydia trachomatis, Neisseria gonorrhoeae, Trichomonas vaginalis, PCR - Swab, Vagina; Future  -     RPR; Future  -     Hepatitis C Antibody  -     HIV-1 / O / 2 Ag / Antibody  -     Genital Culture - Swab, Vagina; Future  -     Chlamydia trachomatis, Neisseria gonorrhoeae, Trichomonas vaginalis, PCR - Swab, Vagina  -     RPR  -     Genital Culture - Swab, Vagina    2. Unprotected sex  -     Chlamydia trachomatis, Neisseria gonorrhoeae, Trichomonas vaginalis, PCR - Swab, Vagina; Future  -     RPR; Future  -     Hepatitis C Antibody  -     HIV-1 / O / 2 Ag / Antibody  -     Genital Culture - Swab, Vagina; Future  -     Chlamydia trachomatis, Neisseria gonorrhoeae, Trichomonas vaginalis, PCR - Swab, Vagina  -     RPR  -     Genital Culture - Swab, Vagina    -STI testing in addition to the culture today  -Urinalysis ordered  -Pap smear is up-to-date per GYN but may consider sooner follow-up if symptoms persist  -Consider pelvic ultrasound due to pain       I spent 30 minutes caring for Yuridia on this date of service. This time includes time spent by me in the following activities:preparing for the visit, obtaining and/or reviewing a separately obtained history, performing a medically appropriate examination and/or evaluation , counseling and educating the patient/family/caregiver, ordering medications, tests, or procedures, documenting information in the medical record, and care coordination  Follow Up   Return if symptoms worsen or fail to improve.  Patient was given instructions and counseling regarding her condition or for health maintenance advice. Please see specific information pulled into the AVS if appropriate.

## 2024-12-02 NOTE — TELEPHONE ENCOUNTER
[place]  HISTORY AND PHYSICAL    Patient: Kleber Bae Date of Service: 7/18/2018   YOB: 1946 Admission Date: 7/18/2018   MRN: [id number] Attending: Charlie Crystal MD   PCP: Carlos Manuel New MD   Hospital Day: Hospital Day: 1     PRIMARY CARDIOLOGIST: Marah    CHIEF COMPLAINT:  No chief complaint on file.      HISTORY OF PRESENT ILLNESS:   Kleber Bae is a 72 year old male with history of CAD, s/p CABG, MI, s/p PCI, post-op atrial fibrillation. PCI in 1999.  July 2013LHC high grade disease in the ostial LAD with a second high grade lesion in the first marginal branch and for emergent CABG.  Hyperlipidemia-Hx of myalgia with statins.     Pt still having moderate fatigue and SOB    CURRENT AND PREVIOUS CARDIAC STUDIES:     EKG:   Date: reviewed    ECHO:   Date: none recent, 2013 EF 41%    STRESS TEST:  Date:   5/30/17 exercise NM  Treadmill - Cardiolite:  Total Exercise Time: 2:59  Max HR: 144bpm  95% of max predicted 151 bpm  Max BP:  150/85  MAX Workload: 4.60 METS  REASON FOR TERMINATION: Target Met  1. Normal stress/rest myocardial perfusion imaging with evidence for  diaphragmatic attenuation artifact in the basilar inferior wall.  2. Normal gated study with a calculated ejection fraction of 66%.    PRIOR CARDIAC CATH AND INTERVENTIONS:   Date:   2/19/16 [facility]  IMPRESSION:  1.  Severe native coronary artery disease:  Subtotal occlusion of mid left circumflex artery which does not have vein grafts, severe stenosis of proximal left circumflex artery.  Left circumflex artery is dominant system.  Status post successful percutaneous intervention and stenting of proximal and mid segment of the left circumflex artery.  2.  Patent left internal mammary artery to left anterior descending.  3.  Patent vein graft to obtuse marginal branch.  4.  Totally occluded left anterior descending mid segment.     LEFT VENTRICULOGRAM:  Normal left ventricular systolic function with an  Sent a xPeerient message to Mercedes vega. Will be reviewed in the morning when she gets in.    estimated ejection fraction of 55%.    PRIOR CARDIAC SURGERIES:   Date: CABG    CARDIAC DEVICES:   Date: na    REVIEW OF SYSTEMS:   Gen: denies weight change, fever, chills   Eyes: denies vision changes, eye discharge   ENT: denies tinnitus, epistaxis, dysphagia, oral ulcers   Pulm: denies cough, hemoptysis, wheezing   CV: as per HPI   GI: denies nausea or vomiting, bloody stools  : denies dysuria, hematuria, urinary frequency   MSK: denies joint swelling, joint stiffness, muscle weakness   Skin: denies jaundice, rashes   Neuro: denies HA, focal weakness, numbness     PAST HISTORY:     PAST MEDICAL HISTORY:   Past Medical History:   Diagnosis Date   • Ankle and foot deformity, acquired 8/14/2013   • Arthritis    • Bilateral PTT Dysfunction 10/24/2013   • CAD (coronary artery disease) 1997   • Cardiac arrest (CMS/[facility]) 7/20/13    On presentation with MI.   • Cardiac dysrhythmia 8/11/2014    Noted on on 30 day event monitor. 4 symptomatic runs of atrial tachycardia.   • Chest pain on exertion    • Congenital pes planus 5/31/2007   • CORONARY ATHEROSCLER UNSPEC VESSEL 6/7/2002   • DDD (degenerative disc disease), lumbar 9/13/2016   • Encounter for Medicare annual wellness exam 4/12/16   • History of anterior wall myocardial infarction 7/20/13   • History of atrial fibrillation 7/31/2013    Post-operative   • Homocysteinemia (CMS/[facility]) 8/19/13   • HYPERLIPIDEMIA NEC/NOS 8/24/2004   • Hypothyroidism    • Impaired fasting glucose 02/25/2017   • IRRITABLE COLON 1/12/2007   • Normocytic anemia 11/5/2013   • Old myocardial infarction 1999; 2013   • PAD (peripheral artery disease) (CMS/[facility]) 1/28/14    KWAME test: Mild peripheral vascular disease. Asymptomatic at 5 minutes on the treadmill. ABR's are 1.1 bilaterally at rest. ABIs drop to 0.90, bilaterally, with exercise.   • Pain in joint, shoulder region 8/14/2013   • Rotator cuff tear 8/14/2013   • Spondylolisthesis of lumbar region 9/13/2016   • Tooth infection     left  lower; started on abx 2/16/2016 per dentist; cardiologist aware   • Venous insufficiency, peripheral 11/21/12   • Ventricular tachycardia (CMS/[facility]) 11/9/12   • Wears prescription eyeglasses      Patient Active Problem List    Diagnosis Date Noted   • Skin ulcer of left foot, limited to breakdown of skin (CMS/[facility]) 03/22/2018     Priority: Low   • Acquired pes planovalgus 03/22/2018     Priority: Low   • Leukopenia 02/27/2017     Priority: Low     WBC decreased.     • Impaired fasting glucose 02/25/2017     Priority: Low   • Skin ulcer of right foot, limited to breakdown of skin (CMS/[facility]) 02/16/2017     Priority: Low   • Secondary localized osteoarthrosis, ankle and foot 02/16/2017     Priority: Low   • MRSA infection 02/10/2017     Priority: Low   • DDD (degenerative disc disease), lumbar 09/13/2016     Priority: Low   • Spondylolisthesis of lumbar region 09/13/2016     Priority: Low   • Dry eye 01/15/2015     Priority: Low   • Iron deficiency 10/19/2014     Priority: Low   • Headache 10/18/2014     Priority: Low   • Cardiac dysrhythmia 08/11/2014     Priority: Low     Noted on on 30 day event monitor.     • PAD (peripheral artery disease) (CMS/[facility]) 05/06/2014     Priority: Low   • Normocytic anemia 11/05/2013     Priority: Low   • Bilateral PTT Dysfunction 10/24/2013     Priority: Low   • Encounter for long-term (current) use of other medications 08/14/2013     Priority: Low   • Ulnar neuropathy 08/14/2013     Priority: Low   • Ankle and foot deformity, acquired 08/14/2013     Priority: Low   • Pain in joint, shoulder region 08/14/2013     Priority: Low   • Rotator cuff tear 08/14/2013     Priority: Low   • History of atrial fibrillation 07/31/2013     Priority: Low     Post-operative     • Venous insufficiency, peripheral 11/21/2012     Priority: Low   • Dermatophytosis of foot 04/24/2012     Priority: Low   • subacute iridocyclitis, OS 08/01/2011     Priority: Low   • Senile nuclear sclerosis 08/01/2011      Priority: Low   • Congenital pes planus 05/31/2007     Priority: Low   • Contracture of tendon (sheath) 05/31/2007     Priority: Low   • Irritable bowel syndrome 01/12/2007     Priority: Low   • Other and unspecified hyperlipidemia 08/24/2004     Priority: Low   • Unspecified hypothyroidism 04/14/2003     Priority: Low   • Coronary atherosclerosis of unspecified type of vessel, native or graft 06/07/2002     Priority: Low       HOME MEDICATIONS:    No current facility-administered medications on file prior to encounter.   Current Outpatient Prescriptions on File Prior to Encounter:  sotalol (BETAPACE) 80 MG tablet   nitroGLYcerin (NITROSTAT) 0.4 MG sublingual tablet   isosorbide mononitrate (IMDUR) 30 MG 24 hr tablet   Evolocumab 140 MG/ML Solution Auto-injector   diclofenac (VOLTAREN) 1 % gel   clopidogrel (PLAVIX) 75 MG tablet   naproxen sodium (ALEVE) 220 MG tablet   pantoprazole (PROTONIX) 20 MG tablet   levothyroxine (SYNTHROID, LEVOTHROID) 150 MCG tablet   folic acid (FOLATE) 400 MCG tablet   Cholecalciferol (VITAMIN D3) 1000 UNIT tablet       ALLERGIES  ALLERGIES:   Allergen Reactions   • Crestor [Rosuvastatin] MYALGIA   • Lipitor [Atorvastatin Calcium]      muscle aches, tiredness   • Neomycin-Bacitracin-Polymyxin Other (See Comments)   • Pitavastatin MYALGIA   • Pravastatin MYALGIA   • Simvastatin WEAKNESS and MYALGIA       FAMILY HISTORY:  family history includes Dementia/Alzheimers in his mother; Heart disease in his father; Stroke in his father.    SOCIAL HISTORY:   reports that he quit smoking about 21 years ago. He has a 60.00 pack-year smoking history. He has never used smokeless tobacco. He reports that he drinks alcohol. He reports that he does not use drugs.      PHYSICAL EXAM:   Blood pressure 133/71, pulse 78, temperature 97.7 °F (36.5 °C), temperature source Oral, resp. rate 18, height 5' 10\" (1.778 m), weight 97.4 kg, SpO2 97 %.  No intake or output data in the 24 hours ending 07/18/18  0944  PHYSICAL EXAM:  General:  Awake, alert and oriented and in no acute distress  Eyes: No xanthelasma, EOMI (extraocular movements intact), anicteric sclerae  ENT/Mouth: No ear discharge. Mucous membranes moist.  Neck: Spontaneous full range of motion, no thyromegaly, trachea midline.  Cardiovascular system: RRR +s1/s2 no MHTs no JVD  Respiratory: CTABL  Abdomen: Soft, nontender. No hepatosplenomegaly.   Extremities:  No peripheral edema no digital cyanosis no clubbing   Psychiatric: Alert, oriented to time, place, and person. Mood and affect appropriate.  Lymphatic: No cervical or supraclavicular lymphadenopathy.  Skin: Warm and dry, no rashes visualized  Neurological: CN (cranial nerves) II-XII grossly intact. Bilateral sensory and motor function normal.  Musculoskeletal: No joint pain and no visual signs of joint inflammation.     CURRENT MEDICATIONS:   Scheduled:  • aspirin  325 mg Oral Daily   • sodium chloride (PF)  2 mL Injection 2 times per day       Infusions:  • sodium chloride 0.9% infusion         PRN:  acetaminophen, cloNIDine, hydrALAZINE, potassium chloride **OR** potassium chloride **OR** potassium chloride, potassium chloride **OR** potassium chloride **OR** potassium chloride, sodium chloride (PF)    LABS:     CARDIAC MARKERS: No results found    BNP: No results found    CBC:   Recent Labs  Lab 07/12/18  1702   WBC 6.1   HGB 13.6   HCT 41.2          CMP:  Recent Labs  Lab 07/12/18  1702   SODIUM 140   POTASSIUM 4.0   CHLORIDE 104   CO2 25   BUN 18   CREATININE 0.82   GLUCOSE 87   ALBUMIN 3.9   GPT 22   ALKPT 81   BILIRUBIN 0.6       LIPID PANEL:   Recent Labs  Lab 07/12/18  1702   CHOLESTEROL 168   HDL 40       HbA1c: No results found for: HGBA1C    COAGULATION STUDIES: No results found    BEST PRACTICE BOX     AMI WITH Heart Failure with Reduced LVEF (<40%)?    no  AMI?  No  Heart Failure with Reduced LVEF (< 40%)?  No    ASSESSMENT, PROBLEM LIST, AND PLAN:   Kleber Bae is a 72  year old male with history of CAD s/p CABG('13), post op AF, HLD HTN PAD     # CAD s/p CABG: LIMA-LAD, VG-OM2, PCI BUBBA Lcx, small RCA severe dx    -c/w LHC/coronary angiogram today    Patient seen with Dr. Crystal, who formulated the plan.    Don Rashid,    Cardiology Fellow PGY5  7/18/2018 9:44 AM  Pager [phone]    COVERAGE (AFTER 5 PM ON WEEKDAYS AND ON WEEKENDS)  Monday-Thursday (5 pm to 7am): Please page on call cardiology fellow at [phone]  If patient had a cardiac procedure today, page on call cath fellow for procedure related issues from 5 pm to 7am.

## 2024-12-02 NOTE — TELEPHONE ENCOUNTER
Caller: Yuridia Martinez    Relationship: Self    Best call back number:     Requested Prescriptions:   Requested Prescriptions     Pending Prescriptions Disp Refills    amphetamine-dextroamphetamine XR (Adderall XR) 20 MG 24 hr capsule 30 capsule 0     Sig: Take 1 capsule by mouth Every Morning        Pharmacy where request should be sent: [id number] - [place], [place] - [address] - [phone] PH - [phone] FX     Last office visit with prescribing clinician: 12/2/2024   Last telemedicine visit with prescribing clinician: Visit date not found   Next office visit with prescribing clinician: 1/6/2025     Additional details provided by patient:     Does the patient have less than a 3 day supply:  [x] Yes  [] No    May Marr Rep   12/02/24 15:33 EST

## 2024-12-02 NOTE — TELEPHONE ENCOUNTER
"  Caller: Michelle Forrestdonovan    Relationship: Self    Best call back number: [phone]    What medication are you requesting:     SEMAGLUTIDE-WEIGHT MANAGEMENT SC       What are your current symptoms:     How long have you been experiencing symptoms:     Have you had these symptoms before:    [] Yes  [] No    Have you been treated for these symptoms before:   [] Yes  [] No    If a prescription is needed, what is your preferred pharmacy and phone number: [facility] \"COMPOUNDS ONLY\" - [address] [phone] [facility] [phone]      Additional notes: PATIENT IS CALLING IN TO REQUEST A MED REFILL ON HER     SEMAGLUTIDE-WEIGHT MANAGEMENT SC   BUT THIS WAS NOT PRESCRIBED BY DR FREEDMAN.           "

## 2024-12-02 NOTE — PROGRESS NOTES
Venipuncture Blood Specimen Collection  Venipuncture performed in the right arm by Rosalina Higginbotham MA with good hemostasis. Patient tolerated the procedure well without complications.   12/02/24   Rosalina Higginbotham MA

## 2024-12-03 DIAGNOSIS — F90.2 ATTENTION DEFICIT HYPERACTIVITY DISORDER (ADHD), COMBINED TYPE: ICD-10-CM

## 2024-12-03 LAB
HCV AB SER QL: NORMAL
HIV 1+2 AB+HIV1 P24 AG SERPL QL IA: NORMAL

## 2024-12-03 RX ORDER — DEXTROAMPHETAMINE SACCHARATE, AMPHETAMINE ASPARTATE MONOHYDRATE, DEXTROAMPHETAMINE SULFATE AND AMPHETAMINE SULFATE 5; 5; 5; 5 MG/1; MG/1; MG/1; MG/1
20 CAPSULE, EXTENDED RELEASE ORAL EVERY MORNING
Qty: 30 CAPSULE | Refills: 0 | Status: SHIPPED | OUTPATIENT
Start: 2024-12-03

## 2024-12-03 RX ORDER — SEMAGLUTIDE 0.25 MG/.5ML
0.5 INJECTION, SOLUTION SUBCUTANEOUS WEEKLY
Qty: 4 ML | Refills: 0 | Status: SHIPPED | OUTPATIENT
Start: 2024-12-03 | End: 2025-01-02

## 2024-12-04 LAB
BACTERIA SPEC AEROBE CULT: ABNORMAL
RPR SER QL: NORMAL

## 2024-12-04 RX ORDER — CEPHALEXIN 500 MG/1
500 CAPSULE ORAL 2 TIMES DAILY
Qty: 14 CAPSULE | Refills: 0 | Status: SHIPPED | OUTPATIENT
Start: 2024-12-04

## 2024-12-05 LAB
BACTERIA SPEC AEROBE CULT: ABNORMAL
BACTERIA SPEC AEROBE CULT: ABNORMAL
GRAM STN SPEC: ABNORMAL

## 2024-12-31 DIAGNOSIS — H65.193 ACUTE EFFUSION OF BOTH MIDDLE EARS: ICD-10-CM

## 2024-12-31 RX ORDER — FLUTICASONE PROPIONATE 50 MCG
SPRAY, SUSPENSION (ML) NASAL
Qty: 16 G | Refills: 0 | Status: SHIPPED | OUTPATIENT
Start: 2024-12-31

## 2025-01-03 ENCOUNTER — TELEPHONE (OUTPATIENT)
Dept: FAMILY MEDICINE CLINIC | Facility: CLINIC | Age: 25
End: 2025-01-03
Payer: MEDICAID

## 2025-01-03 NOTE — TELEPHONE ENCOUNTER
Attempted to call pt to confirm 1/6 appt, no answer: per verbal left msg for pt to call to confirm or if needing to reschedule

## 2025-01-07 ENCOUNTER — TELEPHONE (OUTPATIENT)
Dept: FAMILY MEDICINE CLINIC | Facility: CLINIC | Age: 25
End: 2025-01-07
Payer: MEDICAID

## 2025-01-07 NOTE — TELEPHONE ENCOUNTER
Attempted to call pt to confirm 1/8 appt, no answer: per verbal left msg for pt to call to confirm or if neeeding to reschedule

## 2025-01-15 DIAGNOSIS — E66.813 CLASS 3 SEVERE OBESITY WITHOUT SERIOUS COMORBIDITY WITH BODY MASS INDEX (BMI) OF 40.0 TO 44.9 IN ADULT, UNSPECIFIED OBESITY TYPE: ICD-10-CM

## 2025-01-15 DIAGNOSIS — E66.01 CLASS 3 SEVERE OBESITY WITHOUT SERIOUS COMORBIDITY WITH BODY MASS INDEX (BMI) OF 40.0 TO 44.9 IN ADULT, UNSPECIFIED OBESITY TYPE: ICD-10-CM

## 2025-01-27 ENCOUNTER — TELEPHONE (OUTPATIENT)
Dept: FAMILY MEDICINE CLINIC | Facility: CLINIC | Age: 25
End: 2025-01-27
Payer: MEDICAID

## 2025-01-27 RX ORDER — SEMAGLUTIDE 0.25 MG/.5ML
0.5 INJECTION, SOLUTION SUBCUTANEOUS WEEKLY
Qty: 4 ML | Refills: 0 | Status: SHIPPED | OUTPATIENT
Start: 2025-01-27 | End: 2025-02-26

## 2025-01-27 RX ORDER — VALACYCLOVIR HYDROCHLORIDE 1 G/1
TABLET, FILM COATED ORAL
Qty: 60 TABLET | Refills: 0 | Status: SHIPPED | OUTPATIENT
Start: 2025-01-27

## 2025-01-27 NOTE — TELEPHONE ENCOUNTER
"Caller: Michelle Yuridia    Relationship: Self    Best call back number  [phone]     Requested Prescriptions: [facility] INJECTION       Pharmacy where request should be sent: [facility] \"COMPOUN[facility] ONLY\" - [place], [place] - [address] - [phone]  - [phone]      Last office visit with prescribing clinician: 12/2/2024   Last telemedicine visit with prescribing clinician: Visit date not found   Next office visit with prescribing clinician: Visit date not found     Additional details provided by patient: PATIENTS SHOT IS DUE TOMORROW     Does the patient have less than a 3 day supply:  [x] Yes  [] No    Would you like a call back once the refill request has been completed: [] Yes [x] No    If the office needs to give you a call back, can they leave a voicemail: [x] Yes [] No    May Peña Rep   01/27/25 09:34 EST         "

## 2025-01-28 ENCOUNTER — OFFICE VISIT (OUTPATIENT)
Dept: FAMILY MEDICINE CLINIC | Facility: CLINIC | Age: 25
End: 2025-01-28
Payer: MEDICAID

## 2025-01-28 ENCOUNTER — LAB (OUTPATIENT)
Dept: FAMILY MEDICINE CLINIC | Facility: CLINIC | Age: 25
End: 2025-01-28
Payer: MEDICAID

## 2025-01-28 VITALS
SYSTOLIC BLOOD PRESSURE: 122 MMHG | WEIGHT: 214 LBS | HEIGHT: 63 IN | HEART RATE: 90 BPM | DIASTOLIC BLOOD PRESSURE: 78 MMHG | BODY MASS INDEX: 37.92 KG/M2 | OXYGEN SATURATION: 98 %

## 2025-01-28 DIAGNOSIS — F90.2 ATTENTION DEFICIT HYPERACTIVITY DISORDER (ADHD), COMBINED TYPE: Primary | ICD-10-CM

## 2025-01-28 DIAGNOSIS — R53.82 CHRONIC FATIGUE: ICD-10-CM

## 2025-01-28 DIAGNOSIS — F41.9 ANXIETY: ICD-10-CM

## 2025-01-28 LAB — INSULIN SERPL-ACNC: 23.5 UU/ML (ref 2–23)

## 2025-01-28 PROCEDURE — 99214 OFFICE O/P EST MOD 30 MIN: CPT | Performed by: NURSE PRACTITIONER

## 2025-01-28 PROCEDURE — 83525 ASSAY OF INSULIN: CPT | Performed by: NURSE PRACTITIONER

## 2025-01-28 PROCEDURE — 83036 HEMOGLOBIN GLYCOSYLATED A1C: CPT | Performed by: NURSE PRACTITIONER

## 2025-01-28 PROCEDURE — 36415 COLL VENOUS BLD VENIPUNCTURE: CPT | Performed by: NURSE PRACTITIONER

## 2025-01-28 PROCEDURE — 1160F RVW MEDS BY RX/DR IN RCRD: CPT | Performed by: NURSE PRACTITIONER

## 2025-01-28 PROCEDURE — 1159F MED LIST DOCD IN RCRD: CPT | Performed by: NURSE PRACTITIONER

## 2025-01-28 RX ORDER — SERTRALINE HYDROCHLORIDE 25 MG/1
25 TABLET, FILM COATED ORAL DAILY
Qty: 30 TABLET | Refills: 1 | Status: SHIPPED | OUTPATIENT
Start: 2025-01-28

## 2025-01-28 NOTE — PROGRESS NOTES
"Chief Complaint  Anxiety (Seems to be worse over the last 6 months )    Rajendra Martinez presents to [facility]  History of Present Illness    Patient presents with concerns of increasing anxiety, worse over the last 6 months - describes an uncertainty, mind racing and having more \"anxiety attacks.\" She reports cannot calm herself down to focus for school.  Patient has not been on medication previously. Patient has a history of ADHD, currently prescribed Adderall XR 20 mg daily.     Patient c/o drowsiness after eating sugary foods. A1C normal in September but patient is wanting A1C and insulin levels checked.     PHQ-9 Depression Screening  Little interest or pleasure in doing things? Not at all   Feeling down, depressed, or hopeless? Not at all   PHQ-2 Total Score 0   Trouble falling or staying asleep, or sleeping too much?     Feeling tired or having little energy?     Poor appetite or overeating?     Feeling bad about yourself - or that you are a failure or have let yourself or your family down?     Trouble concentrating on things, such as reading the newspaper or watching television?     Moving or speaking so slowly that other people could have noticed? Or the opposite - being so fidgety or restless that you have been moving around a lot more than usual?     Thoughts that you would be better off dead, or of hurting yourself in some way?     PHQ-9 Total Score     If you checked off any problems, how difficult have these problems made it for you to do your work, take care of things at home, or get along with other people? Not difficult at all      Objective   Vital Signs:  /78 (BP Location: Left arm, Patient Position: Sitting, Cuff Size: Large Adult)   Pulse 90   Ht 160 cm (63\")   Wt 97.1 kg (214 lb)   SpO2 98%   BMI 37.91 kg/m²   Estimated body mass index is 37.91 kg/m² as calculated from the following:    Height as of this encounter: 160 cm (63\").    Weight " as of this encounter: 97.1 kg (214 lb).          Physical Exam  Constitutional:       Appearance: Normal appearance.   HENT:      Head: Normocephalic.   Cardiovascular:      Rate and Rhythm: Normal rate and regular rhythm.   Pulmonary:      Effort: Pulmonary effort is normal.      Breath sounds: Normal breath sounds.   Abdominal:      General: Abdomen is flat. Bowel sounds are normal.      Palpations: Abdomen is soft.   Musculoskeletal:         General: Normal range of motion.      Cervical back: Neck supple.      Right lower leg: No edema.      Left lower leg: No edema.   Skin:     General: Skin is warm and dry.   Neurological:      Mental Status: She is alert and oriented to person, place, and time.      Gait: Gait is intact.   Psychiatric:         Attention and Perception: Attention normal.         Mood and Affect: Mood normal.         Speech: Speech normal.        Result Review :                Assessment and Plan   Diagnoses and all orders for this visit:    1. Attention deficit hyperactivity disorder (ADHD), combined type (Primary)  Assessment & Plan:  Psychological condition is stable.  Continue current treatment regimen.  Psychological condition  will be reassessed at the next regular appointment.      2. Anxiety  Assessment & Plan:  Start Zoloft 25 mg daily  Reviewed possible side effects of SSRI medications  Follow up in 4 weeks, sooner if needed    Orders:  -     sertraline (Zoloft) 25 MG tablet; Take 1 tablet by mouth Daily.  Dispense: 30 tablet; Refill: 1    3. Chronic fatigue  Comments:  1. Regardless of lab results, I did recommend decreasing sugary foods and drinks  Orders:  -     Hemoglobin A1c  -     Insulin, Total           I spent 25 minutes caring for Yuridia on this date of service. This time includes time spent by me in the following activities:preparing for the visit, obtaining and/or reviewing a separately obtained history, performing a medically appropriate examination and/or evaluation ,  counseling and educating the patient/family/caregiver, ordering medications, tests, or procedures, documenting information in the medical record, and care coordination  Follow Up   Return in about 4 weeks (around 2/25/2025) for Anxiety.  Patient was given instructions and counseling regarding her condition or for health maintenance advice. Please see specific information pulled into the AVS if appropriate.

## 2025-01-28 NOTE — ASSESSMENT & PLAN NOTE
Start Zoloft 25 mg daily  Reviewed possible side effects of SSRI medications  Follow up in 4 weeks, sooner if needed

## 2025-01-29 LAB — HBA1C MFR BLD: 5.2 % (ref 4.8–5.6)

## 2025-02-24 RX ORDER — SEMAGLUTIDE 1 MG/.5ML
1 INJECTION, SOLUTION SUBCUTANEOUS WEEKLY
Qty: 2 ML | Refills: 0 | Status: SHIPPED | OUTPATIENT
Start: 2025-02-24 | End: 2025-03-26

## 2025-02-25 ENCOUNTER — TELEPHONE (OUTPATIENT)
Dept: FAMILY MEDICINE CLINIC | Facility: CLINIC | Age: 25
End: 2025-02-25
Payer: MEDICAID

## 2025-02-25 NOTE — TELEPHONE ENCOUNTER
Attempted to call pt to confirm 2/26 appt, no answer: per verbal left msg for pt to call to confirm or if needing to reschedule

## 2025-03-05 DIAGNOSIS — F41.9 ANXIETY: ICD-10-CM

## 2025-03-05 RX ORDER — SERTRALINE HYDROCHLORIDE 25 MG/1
25 TABLET, FILM COATED ORAL DAILY
Qty: 30 TABLET | Refills: 1 | Status: SHIPPED | OUTPATIENT
Start: 2025-03-05

## 2025-04-01 DIAGNOSIS — F90.2 ATTENTION DEFICIT HYPERACTIVITY DISORDER (ADHD), COMBINED TYPE: ICD-10-CM

## 2025-04-01 RX ORDER — DEXTROAMPHETAMINE SACCHARATE, AMPHETAMINE ASPARTATE MONOHYDRATE, DEXTROAMPHETAMINE SULFATE AND AMPHETAMINE SULFATE 5; 5; 5; 5 MG/1; MG/1; MG/1; MG/1
20 CAPSULE, EXTENDED RELEASE ORAL EVERY MORNING
Qty: 30 CAPSULE | Refills: 0 | Status: SHIPPED | OUTPATIENT
Start: 2025-04-01 | End: 2025-04-02

## 2025-04-01 NOTE — TELEPHONE ENCOUNTER
Caller: Yuridia Martinez    Relationship: Self    Best call back number: [phone]     Requested Prescriptions:   Requested Prescriptions     Pending Prescriptions Disp Refills    amphetamine-dextroamphetamine XR (Adderall XR) 20 MG 24 hr capsule 30 capsule 0     Sig: Take 1 capsule by mouth Every Morning        Pharmacy where request should be sent: [id number] [place] [address] [facility] [address] [phone] [facility] [phone] FX     Last office visit with prescribing clinician: 1/28/2025   Last telemedicine visit with prescribing clinician: Visit date not found   Next office visit with prescribing clinician: 4/2/2025     Additional details provided by patient:      Does the patient have less than a 3 day supply:  [x] Yes  [] No    Would you like a call back once the refill request has been completed: [] Yes [] No    If the office needs to give you a call back, can they leave a voicemail: [] Yes [] No    May Curry Rep   04/01/25 12:19 EDT

## 2025-04-02 ENCOUNTER — OFFICE VISIT (OUTPATIENT)
Dept: FAMILY MEDICINE CLINIC | Facility: CLINIC | Age: 25
End: 2025-04-02
Payer: MEDICAID

## 2025-04-02 ENCOUNTER — LAB (OUTPATIENT)
Dept: FAMILY MEDICINE CLINIC | Facility: CLINIC | Age: 25
End: 2025-04-02
Payer: MEDICAID

## 2025-04-02 VITALS
WEIGHT: 189 LBS | SYSTOLIC BLOOD PRESSURE: 122 MMHG | HEIGHT: 63 IN | BODY MASS INDEX: 33.49 KG/M2 | OXYGEN SATURATION: 99 % | HEART RATE: 77 BPM | DIASTOLIC BLOOD PRESSURE: 80 MMHG

## 2025-04-02 DIAGNOSIS — E66.811 CLASS 1 OBESITY WITHOUT SERIOUS COMORBIDITY WITH BODY MASS INDEX (BMI) OF 33.0 TO 33.9 IN ADULT, UNSPECIFIED OBESITY TYPE: ICD-10-CM

## 2025-04-02 DIAGNOSIS — E28.2 PCOS (POLYCYSTIC OVARIAN SYNDROME): ICD-10-CM

## 2025-04-02 DIAGNOSIS — E55.9 VITAMIN D DEFICIENCY: ICD-10-CM

## 2025-04-02 DIAGNOSIS — Z23 NEED FOR TDAP VACCINATION: ICD-10-CM

## 2025-04-02 DIAGNOSIS — R53.82 CHRONIC FATIGUE: ICD-10-CM

## 2025-04-02 DIAGNOSIS — F90.2 ATTENTION DEFICIT HYPERACTIVITY DISORDER (ADHD), COMBINED TYPE: Primary | ICD-10-CM

## 2025-04-02 DIAGNOSIS — F41.9 ANXIETY: ICD-10-CM

## 2025-04-02 LAB
HBA1C MFR BLD: 4.7 % (ref 4.8–5.6)
INSULIN SERPL-ACNC: 18.2 UU/ML (ref 2–23)

## 2025-04-02 PROCEDURE — 83525 ASSAY OF INSULIN: CPT | Performed by: NURSE PRACTITIONER

## 2025-04-02 PROCEDURE — 36415 COLL VENOUS BLD VENIPUNCTURE: CPT | Performed by: NURSE PRACTITIONER

## 2025-04-02 PROCEDURE — 83036 HEMOGLOBIN GLYCOSYLATED A1C: CPT | Performed by: NURSE PRACTITIONER

## 2025-04-02 PROCEDURE — 82607 VITAMIN B-12: CPT | Performed by: NURSE PRACTITIONER

## 2025-04-02 PROCEDURE — 82306 VITAMIN D 25 HYDROXY: CPT | Performed by: NURSE PRACTITIONER

## 2025-04-02 RX ORDER — SEMAGLUTIDE 1 MG/.5ML
1 INJECTION, SOLUTION SUBCUTANEOUS WEEKLY
Qty: 2 ML | Refills: 0 | Status: SHIPPED | OUTPATIENT
Start: 2025-04-02 | End: 2025-05-02

## 2025-04-02 RX ORDER — DEXTROAMPHETAMINE SACCHARATE, AMPHETAMINE ASPARTATE MONOHYDRATE, DEXTROAMPHETAMINE SULFATE AND AMPHETAMINE SULFATE 6.25; 6.25; 6.25; 6.25 MG/1; MG/1; MG/1; MG/1
25 CAPSULE, EXTENDED RELEASE ORAL EVERY MORNING
Qty: 30 CAPSULE | Refills: 0 | Status: SHIPPED | OUTPATIENT
Start: 2025-04-02

## 2025-04-02 NOTE — ASSESSMENT & PLAN NOTE
Patient's (Body mass index is 33.48 kg/m².) indicates that they are obese (BMI >30) with health conditions that include  PCOS  . Weight is improving with treatment. BMI  is above average; BMI management plan is completed. We discussed portion control, increasing exercise, joining a fitness center or start home based exercise program, Weight Watchers or other Commercial based weight reduction program, management of depression/anxiety/stress to control compensatory eating, decreasing alcohol consumption, and pharmacologic options including Wegovy 1 mg weekly - will need to transition in May due to discontinued Semaglutide .

## 2025-04-02 NOTE — PROGRESS NOTES
"Chief Complaint  Follow-up (Follow up anxiety )    Subjective        Yuridia Martinez presents to [facility]  History of Present Illness    Patient presents for f/u visit regarding anxiety. She previously described concerns of increasing anxiety, worse over the last 6 months - described an uncertainty, mind racing and having more \"anxiety attacks.\" She reports cannot calm herself down to focus for school.  Patient started on Zoloft 25 mg daily - does feel some improvement but felt effects more during first month.  Patient has a history of ADHD, currently prescribed Adderall XR 20 mg daily. She is asking for a dosage increase due to lack of attentiveness at school.     Obesity, current BMI is 33.48. Patient is not eligible for bariatric surgery due to weight. She reports well-balanced diet and regular exercise. Hx PCOS. Patient is taking Metformin 500 mg BID. She is also on Wegovy 1 mg weekly. 35 pound weight loss since starting medication.     Patient wants labs checked today, vitamin levels, insulin and A1C.       Objective   Vital Signs:  /80 (BP Location: Left arm, Patient Position: Sitting, Cuff Size: Adult)   Pulse 77   Ht 160 cm (63\")   Wt 85.7 kg (189 lb)   SpO2 99%   BMI 33.48 kg/m²   Estimated body mass index is 33.48 kg/m² as calculated from the following:    Height as of this encounter: 160 cm (63\").    Weight as of this encounter: 85.7 kg (189 lb).          Physical Exam  Constitutional:       Appearance: Normal appearance.   HENT:      Head: Normocephalic.   Cardiovascular:      Rate and Rhythm: Normal rate and regular rhythm.   Pulmonary:      Effort: Pulmonary effort is normal.      Breath sounds: Normal breath sounds.   Abdominal:      General: Abdomen is flat. Bowel sounds are normal.      Palpations: Abdomen is soft.   Musculoskeletal:         General: Normal range of motion.      Cervical back: Neck supple.      Right lower leg: No edema.      Left lower leg: " No edema.   Skin:     General: Skin is warm and dry.   Neurological:      Mental Status: She is alert and oriented to person, place, and time.      Gait: Gait is intact.   Psychiatric:         Attention and Perception: Attention normal.         Mood and Affect: Mood normal.         Speech: Speech normal.        Result Review :    CMP          9/5/2024    11:06   CMP   Glucose 72    BUN 8    Creatinine 0.79    EGFR 108.6    Sodium 137    Potassium 4.5    Chloride 101    Calcium 10.1    Total Protein 7.7    Albumin 4.9    Globulin 2.8    Total Bilirubin 0.3    Alkaline Phosphatase 57    AST (SGOT) 26    ALT (SGPT) 33    Albumin/Globulin Ratio 1.8    BUN/Creatinine Ratio 10.1    Anion Gap 12.5      CBC          9/5/2024    11:06   CBC   WBC 8.11    RBC 4.50    Hemoglobin 13.7    Hematocrit 40.9    MCV 90.9    MCH 30.4    MCHC 33.5    RDW 12.1    Platelets 440      Lipid Panel          9/5/2024    11:06   Lipid Panel   Total Cholesterol 171    Triglycerides 84    HDL Cholesterol 47    VLDL Cholesterol 16    LDL Cholesterol  108    LDL/HDL Ratio 2.28      TSH          9/5/2024    11:06   TSH   TSH 1.690      Most Recent A1C          1/28/2025    08:45   HGBA1C Most Recent   Hemoglobin A1C 5.20                Assessment and Plan   Diagnoses and all orders for this visit:    1. Attention deficit hyperactivity disorder (ADHD), combined type (Primary)  Assessment & Plan:  Psychological condition is worsening.  Medication changes per orders.  Psychological condition  will be reassessed at the next regular appointment.    Orders:  -     amphetamine-dextroamphetamine XR (Adderall XR) 25 MG 24 hr capsule; Take 1 capsule by mouth Every Morning  Dispense: 30 capsule; Refill: 0    2. Anxiety  Assessment & Plan:  Improving  Increase Zoloft to 50 mg daily    Orders:  -     sertraline (ZOLOFT) 50 MG tablet; Take 1 tablet by mouth Daily.  Dispense: 30 tablet; Refill: 2    3. Class 1 obesity without serious comorbidity with body mass  index (BMI) of 33.0 to 33.9 in adult, unspecified obesity type  Assessment & Plan:  Patient's (Body mass index is 33.48 kg/m².) indicates that they are obese (BMI >30) with health conditions that include  PCOS  . Weight is improving with treatment. BMI  is above average; BMI management plan is completed. We discussed portion control, increasing exercise, joining a fitness center or start home based exercise program, Weight Watchers or other Commercial based weight reduction program, management of depression/anxiety/stress to control compensatory eating, decreasing alcohol consumption, and pharmacologic options including Wegovy 1 mg weekly - will need to transition in May due to discontinued Semaglutide .     Orders:  -     Semaglutide-Weight Management (Wegovy) 1 MG/0.5ML solution auto-injector; Inject 0.5 mL under the skin into the appropriate area as directed 1 (One) Time Per Week for 30 days.  Dispense: 2 mL; Refill: 0    4. PCOS (polycystic ovarian syndrome)  -     Hemoglobin A1c  -     Insulin, Total; Future    5. Vitamin D deficiency  -     Vitamin D,25-Hydroxy    6. Chronic fatigue  -     Vitamin B12    7. Need for Tdap vaccination  -     Tdap Vaccine => 6yo IM (BOOSTRIX/ADACEL)           I spent 30 minutes caring for Yuridia on this date of service. This time includes time spent by me in the following activities:preparing for the visit, reviewing tests, obtaining and/or reviewing a separately obtained history, performing a medically appropriate examination and/or evaluation , counseling and educating the patient/family/caregiver, ordering medications, tests, or procedures, documenting information in the medical record, and care coordination  Follow Up   Return in about 3 months (around 7/2/2025) for Anxiety.  Patient was given instructions and counseling regarding her condition or for health maintenance advice. Please see specific information pulled into the AVS if appropriate.

## 2025-04-03 ENCOUNTER — RESULTS FOLLOW-UP (OUTPATIENT)
Dept: FAMILY MEDICINE CLINIC | Facility: CLINIC | Age: 25
End: 2025-04-03
Payer: MEDICAID

## 2025-04-03 LAB
25(OH)D3 SERPL-MCNC: 48.8 NG/ML (ref 30–100)
VIT B12 BLD-MCNC: 929 PG/ML (ref 211–946)

## 2025-04-14 DIAGNOSIS — E66.811 CLASS 1 OBESITY WITHOUT SERIOUS COMORBIDITY WITH BODY MASS INDEX (BMI) OF 33.0 TO 33.9 IN ADULT, UNSPECIFIED OBESITY TYPE: ICD-10-CM

## 2025-04-14 DIAGNOSIS — Z11.1 SCREENING-PULMONARY TB: Primary | ICD-10-CM

## 2025-04-14 RX ORDER — SEMAGLUTIDE 1 MG/.5ML
1 INJECTION, SOLUTION SUBCUTANEOUS WEEKLY
Qty: 2 ML | Refills: 0 | Status: SHIPPED | OUTPATIENT
Start: 2025-04-14 | End: 2025-05-14

## 2025-04-21 ENCOUNTER — LAB (OUTPATIENT)
Dept: LAB | Facility: HOSPITAL | Age: 25
End: 2025-04-21
Payer: MEDICAID

## 2025-04-21 DIAGNOSIS — Z11.1 SCREENING-PULMONARY TB: ICD-10-CM

## 2025-04-21 PROCEDURE — 86480 TB TEST CELL IMMUN MEASURE: CPT

## 2025-04-21 PROCEDURE — 36415 COLL VENOUS BLD VENIPUNCTURE: CPT

## 2025-04-23 LAB
GAMMA INTERFERON BACKGROUND BLD IA-ACNC: 0.05 IU/ML
M TB IFN-G BLD-IMP: NEGATIVE
M TB IFN-G CD4+ BCKGRND COR BLD-ACNC: 0.05 IU/ML
M TB IFN-G CD4+CD8+ BCKGRND COR BLD-ACNC: 0.08 IU/ML
MITOGEN IGNF BCKGRD COR BLD-ACNC: >10 IU/ML
QUANTIFERON INCUBATION: NORMAL
SERVICE CMNT-IMP: NORMAL

## 2025-04-24 RX ORDER — FLUCONAZOLE 150 MG/1
150 TABLET ORAL ONCE
Qty: 1 TABLET | Refills: 0 | Status: SHIPPED | OUTPATIENT
Start: 2025-04-24 | End: 2025-04-24

## 2025-04-29 DIAGNOSIS — E66.813 CLASS 3 SEVERE OBESITY WITHOUT SERIOUS COMORBIDITY WITH BODY MASS INDEX (BMI) OF 40.0 TO 44.9 IN ADULT, UNSPECIFIED OBESITY TYPE: ICD-10-CM

## 2025-04-29 DIAGNOSIS — E66.01 CLASS 3 SEVERE OBESITY WITHOUT SERIOUS COMORBIDITY WITH BODY MASS INDEX (BMI) OF 40.0 TO 44.9 IN ADULT, UNSPECIFIED OBESITY TYPE: ICD-10-CM

## 2025-04-30 ENCOUNTER — OFFICE VISIT (OUTPATIENT)
Dept: FAMILY MEDICINE CLINIC | Facility: CLINIC | Age: 25
End: 2025-04-30
Payer: MEDICAID

## 2025-04-30 VITALS
DIASTOLIC BLOOD PRESSURE: 70 MMHG | BODY MASS INDEX: 34.38 KG/M2 | WEIGHT: 194 LBS | HEIGHT: 63 IN | SYSTOLIC BLOOD PRESSURE: 128 MMHG | HEART RATE: 83 BPM | OXYGEN SATURATION: 98 %

## 2025-04-30 DIAGNOSIS — R21 RASH AND NONSPECIFIC SKIN ERUPTION: ICD-10-CM

## 2025-04-30 DIAGNOSIS — H65.193 ACUTE EFFUSION OF BOTH MIDDLE EARS: Primary | ICD-10-CM

## 2025-04-30 DIAGNOSIS — L65.9 ALOPECIA: ICD-10-CM

## 2025-04-30 PROCEDURE — 1159F MED LIST DOCD IN RCRD: CPT | Performed by: NURSE PRACTITIONER

## 2025-04-30 PROCEDURE — 1160F RVW MEDS BY RX/DR IN RCRD: CPT | Performed by: NURSE PRACTITIONER

## 2025-04-30 PROCEDURE — 99213 OFFICE O/P EST LOW 20 MIN: CPT | Performed by: NURSE PRACTITIONER

## 2025-04-30 RX ORDER — CETIRIZINE HYDROCHLORIDE 10 MG/1
10 TABLET ORAL DAILY
Qty: 30 TABLET | Refills: 1 | Status: SHIPPED | OUTPATIENT
Start: 2025-04-30

## 2025-04-30 RX ORDER — SPIRONOLACTONE 100 MG/1
100 TABLET, FILM COATED ORAL DAILY
Qty: 30 TABLET | Refills: 2 | Status: SHIPPED | OUTPATIENT
Start: 2025-04-30

## 2025-04-30 RX ORDER — TRIAMCINOLONE ACETONIDE 55 UG/1
2 SPRAY, METERED NASAL DAILY
Qty: 16.5 G | Refills: 11 | Status: SHIPPED | OUTPATIENT
Start: 2025-04-30 | End: 2026-04-30

## 2025-04-30 NOTE — PROGRESS NOTES
"Chief Complaint  Earache (Bilateral ear pain/had hives seem to be improved but at the time hot really hot )    Subjective        Yuridia Martinez presents to [facility]  History of Present Illness    Patient presents with multiple complaints.    Patient complains of bilateral ear pain. Sx ongoing x 3 days. Patient denies drainage or hearing loss but does describe \"an airplane sound\" that comes and goes.     Patient also reports one episode of hives down her back on Monday after an episode of ear pain. She denies fever, recurrent rash, itching, dyspnea or difficulty swallowing.     Objective   Vital Signs:  /70 (BP Location: Left arm, Patient Position: Sitting, Cuff Size: Adult)   Pulse 83   Ht 160 cm (63\")   Wt 88 kg (194 lb)   SpO2 98%   BMI 34.37 kg/m²   Estimated body mass index is 34.37 kg/m² as calculated from the following:    Height as of this encounter: 160 cm (63\").    Weight as of this encounter: 88 kg (194 lb).          Physical Exam  Constitutional:       Appearance: Normal appearance.   HENT:      Head: Normocephalic.      Right Ear: A middle ear effusion is present.      Left Ear: A middle ear effusion is present.   Cardiovascular:      Rate and Rhythm: Normal rate and regular rhythm.   Pulmonary:      Effort: Pulmonary effort is normal.      Breath sounds: Normal breath sounds.   Abdominal:      General: Abdomen is flat. Bowel sounds are normal.      Palpations: Abdomen is soft.   Musculoskeletal:         General: Normal range of motion.      Cervical back: Neck supple.      Right lower leg: No edema.      Left lower leg: No edema.   Skin:     General: Skin is warm and dry.   Neurological:      Mental Status: She is alert and oriented to person, place, and time.      Gait: Gait is intact.   Psychiatric:         Attention and Perception: Attention normal.         Mood and Affect: Mood normal.         Speech: Speech normal.        Result Review :              "   Assessment and Plan   Diagnoses and all orders for this visit:    1. Acute effusion of both middle ears (Primary)    2. Rash and nonspecific skin eruption    Other orders  -     cetirizine (zyrTEC) 10 MG tablet; Take 1 tablet by mouth Daily.  Dispense: 30 tablet; Refill: 1  -     Triamcinolone Acetonide (Nasacort Allergy 24HR) 55 MCG/ACT nasal inhaler; Administer 2 sprays into the nostril(s) as directed by provider Daily.  Dispense: 16.5 g; Refill: 11    Start Zyrtec 10 mg daily  D/C Flonase  Start Nasacort 2 sprays each nostril daily  Call if sx persist or if develops sinus pain/pressure/fever       I spent 20 minutes caring for Yuridia on this date of service. This time includes time spent by me in the following activities:preparing for the visit, reviewing tests, obtaining and/or reviewing a separately obtained history, performing a medically appropriate examination and/or evaluation , counseling and educating the patient/family/caregiver, ordering medications, tests, or procedures, documenting information in the medical record, and care coordination  Follow Up   Return for Next scheduled follow up.  Patient was given instructions and counseling regarding her condition or for health maintenance advice. Please see specific information pulled into the AVS if appropriate.

## 2025-05-16 DIAGNOSIS — F90.2 ATTENTION DEFICIT HYPERACTIVITY DISORDER (ADHD), COMBINED TYPE: ICD-10-CM

## 2025-05-16 RX ORDER — DEXTROAMPHETAMINE SACCHARATE, AMPHETAMINE ASPARTATE MONOHYDRATE, DEXTROAMPHETAMINE SULFATE AND AMPHETAMINE SULFATE 6.25; 6.25; 6.25; 6.25 MG/1; MG/1; MG/1; MG/1
25 CAPSULE, EXTENDED RELEASE ORAL EVERY MORNING
Qty: 30 CAPSULE | Refills: 0 | Status: SHIPPED | OUTPATIENT
Start: 2025-05-16

## 2025-05-16 NOTE — TELEPHONE ENCOUNTER
Caller: Yuridia Martinez    Relationship: Self    Best call back number: [phone]    Requested Prescriptions:   Requested Prescriptions     Pending Prescriptions Disp Refills    amphetamine-dextroamphetamine XR (Adderall XR) 25 MG 24 hr capsule 30 capsule 0     Sig: Take 1 capsule by mouth Every Morning        Pharmacy where request should be sent: [id number] [address] - [phone]  - [phone] FX     Last office visit with prescribing clinician: 4/30/2025   Last telemedicine visit with prescribing clinician: Visit date not found   Next office visit with prescribing clinician: 7/7/2025     Additional details provided by patient: [facility] IS OUT OF STOCK , PATIENT IS CURRENTLY OUT     Does the patient have less than a 3 day supply:  [x] Yes  [] No    Would you like a call back once the refill request has been completed: [] Yes [x] No    If the office needs to give you a call back, can they leave a voicemail: [] Yes [x] No    Bennett Hui   05/16/25 11:44 EDT

## 2025-05-29 DIAGNOSIS — F41.9 ANXIETY: ICD-10-CM

## 2025-05-29 RX ORDER — SERTRALINE HYDROCHLORIDE 25 MG/1
25 TABLET, FILM COATED ORAL DAILY
Qty: 30 TABLET | Refills: 1 | OUTPATIENT
Start: 2025-05-29

## 2025-06-12 RX ORDER — PNV,CALCIUM 72/IRON/FOLIC ACID 27 MG-1 MG
1 TABLET ORAL DAILY
Qty: 30 TABLET | Refills: 1 | Status: SHIPPED | OUTPATIENT
Start: 2025-06-12

## 2025-06-12 RX ORDER — VALACYCLOVIR HYDROCHLORIDE 1 G/1
TABLET, FILM COATED ORAL
Qty: 60 TABLET | Refills: 0 | Status: SHIPPED | OUTPATIENT
Start: 2025-06-12

## 2025-07-07 ENCOUNTER — OFFICE VISIT (OUTPATIENT)
Dept: FAMILY MEDICINE CLINIC | Facility: CLINIC | Age: 25
End: 2025-07-07
Payer: MEDICAID

## 2025-07-07 VITALS
TEMPERATURE: 98.3 F | SYSTOLIC BLOOD PRESSURE: 124 MMHG | DIASTOLIC BLOOD PRESSURE: 62 MMHG | BODY MASS INDEX: 33.06 KG/M2 | HEART RATE: 76 BPM | OXYGEN SATURATION: 99 % | WEIGHT: 186.6 LBS | HEIGHT: 63 IN

## 2025-07-07 DIAGNOSIS — F41.9 ANXIETY: ICD-10-CM

## 2025-07-07 DIAGNOSIS — E66.811 CLASS 1 OBESITY WITHOUT SERIOUS COMORBIDITY WITH BODY MASS INDEX (BMI) OF 33.0 TO 33.9 IN ADULT, UNSPECIFIED OBESITY TYPE: ICD-10-CM

## 2025-07-07 DIAGNOSIS — F90.2 ATTENTION DEFICIT HYPERACTIVITY DISORDER (ADHD), COMBINED TYPE: Primary | ICD-10-CM

## 2025-07-07 DIAGNOSIS — Z30.011 OCP (ORAL CONTRACEPTIVE PILLS) INITIATION: ICD-10-CM

## 2025-07-07 LAB
B-HCG UR QL: NEGATIVE
EXPIRATION DATE: NORMAL
INTERNAL NEGATIVE CONTROL: NORMAL
INTERNAL POSITIVE CONTROL: NORMAL
Lab: NORMAL

## 2025-07-07 PROCEDURE — 1159F MED LIST DOCD IN RCRD: CPT | Performed by: NURSE PRACTITIONER

## 2025-07-07 PROCEDURE — 81025 URINE PREGNANCY TEST: CPT | Performed by: NURSE PRACTITIONER

## 2025-07-07 PROCEDURE — 99214 OFFICE O/P EST MOD 30 MIN: CPT | Performed by: NURSE PRACTITIONER

## 2025-07-07 PROCEDURE — 1160F RVW MEDS BY RX/DR IN RCRD: CPT | Performed by: NURSE PRACTITIONER

## 2025-07-07 RX ORDER — NORETHINDRONE ACETATE AND ETHINYL ESTRADIOL 1MG-20(21)
1 KIT ORAL DAILY
Qty: 28 TABLET | Refills: 12 | Status: SHIPPED | OUTPATIENT
Start: 2025-07-07 | End: 2026-07-07

## 2025-07-07 NOTE — ASSESSMENT & PLAN NOTE
Continue Adderall XR 25 mg daily, refilled today  Accomodation completed for school, allowing patient to test alone

## 2025-07-07 NOTE — PROGRESS NOTES
"Chief Complaint  ADD (Requesting for testing accomodations due to ADHD, to test in secluded room or smaller group for less distraction)    Subjective        Yuridia Martinez presents to [facility]  History of Present Illness    Patient presents for follow-up visit regarding ADHD.  She is currently prescribed Adderall XR 25 mg daily - describing increased grinding of teeth with Adderall. Patient is taking Zoloft 50 mg daily for anxiety. She describes increased difficulty with test taking - specifically in large groups. Patient describes increased distraction when testing with 50 people - failed last test. She is requesting accomodation.     Obesity, current BMI is 33.05. Patient is not eligible for bariatric surgery due to weight. She reports well-balanced diet and regular exercise. Hx PCOS. Patient is taking Metformin 500 mg BID. She is also on Wegovy 1 mg weekly. 43 pound weight loss since starting medication. Patient requesting to start OCP as well.    Objective   Vital Signs:  /62 (BP Location: Left arm, Patient Position: Sitting, Cuff Size: Adult)   Pulse 76   Temp 98.3 °F (36.8 °C) (Oral)   Ht 160 cm (63\")   Wt 84.6 kg (186 lb 9.6 oz)   SpO2 99% Comment: Room air  BMI 33.05 kg/m²   Estimated body mass index is 33.05 kg/m² as calculated from the following:    Height as of this encounter: 160 cm (63\").    Weight as of this encounter: 84.6 kg (186 lb 9.6 oz).          Physical Exam  Constitutional:       Appearance: Normal appearance.   HENT:      Head: Normocephalic.   Cardiovascular:      Rate and Rhythm: Normal rate and regular rhythm.   Pulmonary:      Effort: Pulmonary effort is normal.      Breath sounds: Normal breath sounds.   Abdominal:      General: Abdomen is flat. Bowel sounds are normal.      Palpations: Abdomen is soft.   Musculoskeletal:         General: Normal range of motion.      Cervical back: Neck supple.      Right lower leg: No edema.      Left lower " leg: No edema.   Skin:     General: Skin is warm and dry.   Neurological:      Mental Status: She is alert and oriented to person, place, and time.      Gait: Gait is intact.   Psychiatric:         Attention and Perception: Attention normal.         Mood and Affect: Mood normal.         Speech: Speech normal.        Result Review :                Assessment and Plan   Diagnoses and all orders for this visit:    1. Attention deficit hyperactivity disorder (ADHD), combined type (Primary)  Assessment & Plan:  Continue Adderall XR 25 mg daily, refilled today  Accomodation completed for school, allowing patient to test alone      2. Anxiety  Assessment & Plan:  Continue Zoloft 50 mg daily      3. Class 1 obesity without serious comorbidity with body mass index (BMI) of 33.0 to 33.9 in adult, unspecified obesity type  Assessment & Plan:  Patient's (Body mass index is 33.05 kg/m².) indicates that they are obese (BMI >30) with health conditions that include PCOS . Weight is improving with treatment. BMI  is above average; BMI management plan is completed. We discussed portion control, increasing exercise, joining a fitness center or start home based exercise program, Weight Watchers or other Commercial based weight reduction program, management of depression/anxiety/stress to control compensatory eating, decreasing alcohol consumption, and pharmacologic options including continue Wegovy .       4. OCP (oral contraceptive pills) initiation  -     POCT pregnancy, urine  -     norethindrone-ethinyl estradiol FE (Junel FE 1/20) 1-20 MG-MCG per tablet; Take 1 tablet by mouth Daily.  Dispense: 28 tablet; Refill: 12           I spent 30 minutes caring for Yuridia on this date of service. This time includes time spent by me in the following activities:preparing for the visit, reviewing tests, obtaining and/or reviewing a separately obtained history, performing a medically appropriate examination and/or evaluation , counseling and  educating the patient/family/caregiver, ordering medications, tests, or procedures, documenting information in the medical record, and care coordination    Follow Up   Return in about 3 months (around 10/7/2025) for ADHD.  Patient was given instructions and counseling regarding her condition or for health maintenance advice. Please see specific information pulled into the AVS if appropriate.

## 2025-07-07 NOTE — ASSESSMENT & PLAN NOTE
Patient's (Body mass index is 33.05 kg/m².) indicates that they are obese (BMI >30) with health conditions that include PCOS . Weight is improving with treatment. BMI  is above average; BMI management plan is completed. We discussed portion control, increasing exercise, joining a fitness center or start home based exercise program, Weight Watchers or other Commercial based weight reduction program, management of depression/anxiety/stress to control compensatory eating, decreasing alcohol consumption, and pharmacologic options including continue Wegovy .    no

## 2025-07-16 ENCOUNTER — TELEPHONE (OUTPATIENT)
Dept: FAMILY MEDICINE CLINIC | Facility: CLINIC | Age: 25
End: 2025-07-16
Payer: MEDICAID

## 2025-07-16 DIAGNOSIS — F90.2 ATTENTION DEFICIT HYPERACTIVITY DISORDER (ADHD), COMBINED TYPE: ICD-10-CM

## 2025-07-16 RX ORDER — DEXTROAMPHETAMINE SACCHARATE, AMPHETAMINE ASPARTATE MONOHYDRATE, DEXTROAMPHETAMINE SULFATE AND AMPHETAMINE SULFATE 6.25; 6.25; 6.25; 6.25 MG/1; MG/1; MG/1; MG/1
25 CAPSULE, EXTENDED RELEASE ORAL EVERY MORNING
Qty: 30 CAPSULE | Refills: 0 | Status: SHIPPED | OUTPATIENT
Start: 2025-07-16

## 2025-07-16 NOTE — TELEPHONE ENCOUNTER
Caller: Yuridia Martinez    Relationship: Self    Best call back number: [phone]     Which medication are you concerned about:     norethindrone-ethinyl estradiol FE (Junel FE 1/20) 1-20 MG-MCG per tablet       amphetamine-dextroamphetamine XR (Adderall XR) 25 MG 24 hr capsule       Who prescribed you this medication: JASMINA     What are your concerns:   BIRTH CONTROL-PHARMACY STATES THERE IS AN INTERACTION WITH ANOTHER DRUG   ADDERALL-PLEASE SEND REFILL  [id number] [place] [address] AT [facility] [address] [facility] [phone] [facility] [phone] FX   PLEASE CALL PHARMACY TO CLARIFY

## 2025-07-16 NOTE — TELEPHONE ENCOUNTER
The interaction is that blood pressure may rise which we monitor regularly. All stimulants will have the same interaction with hormone therapy

## 2025-07-16 NOTE — TELEPHONE ENCOUNTER
Spoke with pharmacy and they said the OCP needs a PA because of the interaction. When I try to process th PA I get that it is already covered and that the pharmacy needs to contact a help desk number. I faxed this information to the pharmacy for them to process the refill for the patient. Adderall refill sent to [facility] as well.

## 2025-08-10 DIAGNOSIS — E66.813 CLASS 3 SEVERE OBESITY WITHOUT SERIOUS COMORBIDITY WITH BODY MASS INDEX (BMI) OF 40.0 TO 44.9 IN ADULT, UNSPECIFIED OBESITY TYPE: ICD-10-CM

## 2025-08-10 DIAGNOSIS — F41.9 ANXIETY: ICD-10-CM

## 2025-08-26 DIAGNOSIS — F90.2 ATTENTION DEFICIT HYPERACTIVITY DISORDER (ADHD), COMBINED TYPE: ICD-10-CM

## 2025-08-26 RX ORDER — DEXTROAMPHETAMINE SACCHARATE, AMPHETAMINE ASPARTATE MONOHYDRATE, DEXTROAMPHETAMINE SULFATE AND AMPHETAMINE SULFATE 6.25; 6.25; 6.25; 6.25 MG/1; MG/1; MG/1; MG/1
25 CAPSULE, EXTENDED RELEASE ORAL EVERY MORNING
Qty: 30 CAPSULE | Refills: 0 | Status: SHIPPED | OUTPATIENT
Start: 2025-08-26